# Patient Record
Sex: MALE | Race: BLACK OR AFRICAN AMERICAN | NOT HISPANIC OR LATINO | Employment: FULL TIME | URBAN - METROPOLITAN AREA
[De-identification: names, ages, dates, MRNs, and addresses within clinical notes are randomized per-mention and may not be internally consistent; named-entity substitution may affect disease eponyms.]

---

## 2023-11-06 PROBLEM — R07.89 ATYPICAL CHEST PAIN: Status: ACTIVE | Noted: 2023-11-06

## 2023-11-06 PROBLEM — M25.519 SHOULDER PAIN: Status: ACTIVE | Noted: 2023-11-06

## 2023-11-06 PROBLEM — B35.3 TINEA PEDIS OF BOTH FEET: Status: ACTIVE | Noted: 2023-11-06

## 2023-11-06 PROBLEM — N40.0 BENIGN PROSTATIC HYPERPLASIA: Status: ACTIVE | Noted: 2023-11-06

## 2023-11-06 PROBLEM — K75.9 HEPATITIS: Status: ACTIVE | Noted: 2023-11-06

## 2023-11-06 PROBLEM — R97.20 RISING PSA LEVEL: Status: ACTIVE | Noted: 2023-11-06

## 2023-11-06 PROBLEM — E78.5 DYSLIPIDEMIA: Status: ACTIVE | Noted: 2023-11-06

## 2023-11-06 PROBLEM — R07.9 CHEST PAIN, EXERTIONAL: Status: ACTIVE | Noted: 2023-11-06

## 2023-11-06 PROBLEM — E11.40 DIABETIC NEUROPATHY, TYPE II DIABETES MELLITUS (MULTI): Status: ACTIVE | Noted: 2023-11-06

## 2023-11-06 PROBLEM — C61 PROSTATE CANCER (MULTI): Status: ACTIVE | Noted: 2023-11-06

## 2023-11-06 PROBLEM — R79.89 DECREASED TESTOSTERONE LEVEL: Status: ACTIVE | Noted: 2023-11-06

## 2023-11-06 PROBLEM — R53.83 FATIGUE: Status: ACTIVE | Noted: 2023-11-06

## 2023-11-06 PROBLEM — N52.9 ED (ERECTILE DYSFUNCTION): Status: ACTIVE | Noted: 2023-11-06

## 2023-11-06 PROBLEM — E55.9 VITAMIN D DEFICIENCY: Status: ACTIVE | Noted: 2023-11-06

## 2023-11-06 PROBLEM — I10 HYPERTENSION: Status: ACTIVE | Noted: 2023-11-06

## 2023-11-06 RX ORDER — ATENOLOL 50 MG/1
50 TABLET ORAL DAILY
COMMUNITY

## 2023-11-06 RX ORDER — DULAGLUTIDE 1.5 MG/.5ML
1.5 INJECTION, SOLUTION SUBCUTANEOUS
COMMUNITY
Start: 2022-06-22 | End: 2024-01-03

## 2023-11-06 RX ORDER — LOSARTAN POTASSIUM AND HYDROCHLOROTHIAZIDE 25; 100 MG/1; MG/1
1 TABLET ORAL DAILY
COMMUNITY
Start: 2014-04-01

## 2023-11-06 RX ORDER — METFORMIN HYDROCHLORIDE 750 MG/1
TABLET, EXTENDED RELEASE ORAL
COMMUNITY
Start: 2015-10-30 | End: 2024-02-21 | Stop reason: SDUPTHER

## 2023-11-06 RX ORDER — GABAPENTIN 400 MG/1
1 CAPSULE ORAL 3 TIMES DAILY
COMMUNITY
Start: 2014-07-01

## 2023-11-06 RX ORDER — BLOOD SUGAR DIAGNOSTIC
STRIP MISCELLANEOUS
COMMUNITY
Start: 2018-03-15 | End: 2023-11-08 | Stop reason: ALTCHOICE

## 2023-11-06 RX ORDER — INSULIN DETEMIR 100 [IU]/ML
INJECTION, SOLUTION SUBCUTANEOUS
COMMUNITY
Start: 2019-05-24 | End: 2023-11-08 | Stop reason: ALTCHOICE

## 2023-11-06 RX ORDER — PIOGLITAZONEHYDROCHLORIDE 15 MG/1
1 TABLET ORAL DAILY
COMMUNITY
Start: 2020-09-30 | End: 2024-02-21 | Stop reason: SDUPTHER

## 2023-11-06 RX ORDER — GLIMEPIRIDE 4 MG/1
1 TABLET ORAL 2 TIMES DAILY
COMMUNITY
Start: 2017-07-19 | End: 2024-02-21 | Stop reason: WASHOUT

## 2023-11-06 RX ORDER — ERGOCALCIFEROL 1.25 MG/1
1 CAPSULE ORAL
COMMUNITY
Start: 2020-09-30 | End: 2023-11-20

## 2023-11-06 RX ORDER — ECONAZOLE NITRATE 10 MG/G
CREAM TOPICAL 2 TIMES DAILY
COMMUNITY
Start: 2020-09-30

## 2023-11-06 RX ORDER — TRAZODONE HYDROCHLORIDE 150 MG/1
1 TABLET ORAL NIGHTLY
COMMUNITY
Start: 2016-08-23

## 2023-11-06 RX ORDER — SILDENAFIL 100 MG/1
TABLET, FILM COATED ORAL
COMMUNITY

## 2023-11-08 ENCOUNTER — OFFICE VISIT (OUTPATIENT)
Dept: ENDOCRINOLOGY | Facility: CLINIC | Age: 73
End: 2023-11-08
Payer: MEDICARE

## 2023-11-08 VITALS
SYSTOLIC BLOOD PRESSURE: 144 MMHG | WEIGHT: 225 LBS | BODY MASS INDEX: 36.16 KG/M2 | HEART RATE: 82 BPM | HEIGHT: 66 IN | DIASTOLIC BLOOD PRESSURE: 84 MMHG

## 2023-11-08 DIAGNOSIS — E55.9 VITAMIN D DEFICIENCY: ICD-10-CM

## 2023-11-08 DIAGNOSIS — Z13.29 SCREENING FOR THYROID DISORDER: ICD-10-CM

## 2023-11-08 DIAGNOSIS — E11.9 TYPE 2 DIABETES MELLITUS WITHOUT COMPLICATION, WITH LONG-TERM CURRENT USE OF INSULIN (MULTI): Primary | ICD-10-CM

## 2023-11-08 DIAGNOSIS — Z79.4 TYPE 2 DIABETES MELLITUS WITHOUT COMPLICATION, WITH LONG-TERM CURRENT USE OF INSULIN (MULTI): Primary | ICD-10-CM

## 2023-11-08 LAB — POC HEMOGLOBIN A1C: 9.1 % (ref 4.2–6.5)

## 2023-11-08 PROCEDURE — 1036F TOBACCO NON-USER: CPT | Performed by: PHYSICIAN ASSISTANT

## 2023-11-08 PROCEDURE — 3077F SYST BP >= 140 MM HG: CPT | Performed by: PHYSICIAN ASSISTANT

## 2023-11-08 PROCEDURE — 99214 OFFICE O/P EST MOD 30 MIN: CPT | Performed by: PHYSICIAN ASSISTANT

## 2023-11-08 PROCEDURE — 1159F MED LIST DOCD IN RCRD: CPT | Performed by: PHYSICIAN ASSISTANT

## 2023-11-08 PROCEDURE — 83036 HEMOGLOBIN GLYCOSYLATED A1C: CPT | Performed by: PHYSICIAN ASSISTANT

## 2023-11-08 PROCEDURE — 1126F AMNT PAIN NOTED NONE PRSNT: CPT | Performed by: PHYSICIAN ASSISTANT

## 2023-11-08 PROCEDURE — 3079F DIAST BP 80-89 MM HG: CPT | Performed by: PHYSICIAN ASSISTANT

## 2023-11-08 RX ORDER — BLOOD SUGAR DIAGNOSTIC
1 STRIP MISCELLANEOUS 2 TIMES DAILY
COMMUNITY
End: 2024-01-09 | Stop reason: SDUPTHER

## 2023-11-08 RX ORDER — INSULIN GLARGINE 100 [IU]/ML
30 INJECTION, SOLUTION SUBCUTANEOUS NIGHTLY
COMMUNITY
End: 2024-01-03 | Stop reason: SDUPTHER

## 2023-11-08 NOTE — PROGRESS NOTES
"Subjective   Miguelito Edge is a 73 y.o. male who presents for follow-up of Type 2 diabetes mellitus. The initial diagnosis of diabetes was made  several years ago . The patient does have a known family history of diabetes.    Known complications due to diabetes included nephropathy and obesity    Cardiovascular risk factors include advanced age (older than 55 for men, 65 for women), diabetes mellitus, male gender, and obesity (BMI >= 30 kg/m2). The patient is not on an ACE inhibitor or angiotensin II receptor blocker.  The patient has not been previously hospitalized due to diabetic ketoacidosis.     Current symptoms/problems include hyperglycemia and paresthesia of the feet. His clinical course has been stable.     Current diabetes regimen is as follows:  pioglitazone, basaglar, glimepiride, jardiance, trulicity, and metformin      The patient is currently checking the blood glucose 2+ times per day.    Patient is using: glucometer    Hypoglycemia frequency: none at this time  Hypoglycemia awareness: Yes     Exercise: rarely   Meal panning: He is using avoidance of concentrated sweets.    Review of Systems   All other systems reviewed and are negative.      Objective   /84   Pulse 82   Ht 1.676 m (5' 6\")   Wt 102 kg (225 lb)   BMI 36.32 kg/m²   Physical Exam  Constitutional:       Appearance: Normal appearance. He is normal weight.   HENT:      Head: Normocephalic and atraumatic.      Mouth/Throat:      Mouth: Mucous membranes are dry.      Pharynx: Oropharynx is clear.   Eyes:      Extraocular Movements: Extraocular movements intact.      Conjunctiva/sclera: Conjunctivae normal.   Cardiovascular:      Rate and Rhythm: Normal rate and regular rhythm.      Pulses: Normal pulses.      Heart sounds: Normal heart sounds.   Pulmonary:      Effort: Pulmonary effort is normal.      Breath sounds: Normal breath sounds.   Abdominal:      General: Bowel sounds are normal.      Palpations: Abdomen is soft. "   Skin:     General: Skin is warm and dry.   Neurological:      General: No focal deficit present.      Mental Status: He is alert and oriented to person, place, and time. Mental status is at baseline.   Psychiatric:         Mood and Affect: Mood normal.         Behavior: Behavior normal.         Thought Content: Thought content normal.         Judgment: Judgment normal.         Lab Review  Glucose (mg/dL)   Date Value   08/31/2023 365 (H)   06/01/2023 390 (H)   03/02/2023 184 (H)     Hemoglobin A1C (%)   Date Value   09/30/2020 13.2   12/05/2019 9.2   05/23/2019 8.6     Bicarbonate (mmol/L)   Date Value   08/31/2023 29   06/01/2023 26   03/02/2023 29     Urea Nitrogen (mg/dL)   Date Value   08/31/2023 21   06/01/2023 14   03/02/2023 9     Creatinine (mg/dL)   Date Value   08/31/2023 1.14   06/01/2023 1.00   03/02/2023 0.78       Health Maintenance:   Foot Exam: referral placed to podiatry today  Eye Exam: up to date as of 7/2023  Lipid Panel: due for update, ordered today  Urine Albumin: due for update, ordered today    Assessment/Plan     Type 2 diabetes mellitus, is at goal. A1C above target of <7%    RX changes:  continue jardiance 25mg once daily, pioglitazone 15mg once daily, increase trulicity to 4.5mg once weekly, increase basaglar to 35 units and start taking in the morning, adjust glimepiride 4mg to lunch time, adjust metformin 750mg as 1 tablet twice a day      Education:  interpretation of lab results, blood sugar goals, and complications of diabetes mellitus  Follow up: I recommend diabetes care be 3 months with new endocrine provider.

## 2023-11-08 NOTE — PATIENT INSTRUCTIONS
Type 2 diabetes mellitus, is at goal. A1C above target of <7%    RX changes: continue jardiance 25mg once daily, pioglitazone 15mg once daily, increase trulicity to 4.5mg once weekly, increase basaglar to 35 units and start taking in the morning, adjust glimepiride 4mg to lunch time, adjust metformin 750mg as 1 tablet twice a day     Education:  interpretation of lab results, blood sugar goals, and complications of diabetes mellitus  Follow up: I recommend diabetes care be 3 months as scheduled then with new endocrine provider.  Try to work on eating your meals in order of 1- green vegetables, 2- protein, 3- starch/carb

## 2023-11-18 DIAGNOSIS — E55.9 VITAMIN D DEFICIENCY: Primary | ICD-10-CM

## 2023-11-20 RX ORDER — ERGOCALCIFEROL 1.25 MG/1
1 CAPSULE ORAL
Qty: 13 CAPSULE | Refills: 0 | Status: SHIPPED | OUTPATIENT
Start: 2023-11-20 | End: 2024-02-14 | Stop reason: SDUPTHER

## 2023-11-24 RX ORDER — EPINEPHRINE 0.3 MG/.3ML
0.3 INJECTION SUBCUTANEOUS EVERY 5 MIN PRN
Status: CANCELLED | OUTPATIENT
Start: 2023-11-30

## 2023-11-24 RX ORDER — DIPHENHYDRAMINE HYDROCHLORIDE 50 MG/ML
50 INJECTION INTRAMUSCULAR; INTRAVENOUS AS NEEDED
Status: CANCELLED | OUTPATIENT
Start: 2023-11-30

## 2023-11-24 RX ORDER — ALBUTEROL SULFATE 0.83 MG/ML
3 SOLUTION RESPIRATORY (INHALATION) AS NEEDED
Status: CANCELLED | OUTPATIENT
Start: 2023-11-30

## 2023-11-24 RX ORDER — FAMOTIDINE 10 MG/ML
20 INJECTION INTRAVENOUS ONCE AS NEEDED
Status: CANCELLED | OUTPATIENT
Start: 2023-11-30

## 2023-11-24 NOTE — PROGRESS NOTES
Patient ID: Miguelito Edge is a 73 y.o. male.  Cancer History:          Genitourinary-Prostate         AJCC Edition: 7th (AJCC), Diagnosis Date: 15-Duane-2014, IV (Gl 4+3, iPSA 636), T4 N1  M1 [place] = 7 PSA >= 20     Treatment Synopsis:    PCa history dates back to 2014 when he was diagnosed with high-risk PCa (gCFU041/cT4/N+/GS7- high volume disease) All baseline scans showed no evidence of distant  disease though he did have LN pelvic disease. At that time the decision was for him to initiate systemic therapy with ADT and received definitive therapy with 81Gy that was completed in the summer of 2014. ADT was completed in 2017.  Restarted ADT in 9/2020 for rising PSA       Subjective    HPI  Seen in follow up for his prostate cancer. Here with his wife.   Feeling well.   Energy is good.   Appetite is good.   Denies cough/sob.   Denies n/v.   Denies pain.   Denies dysuria.   Patient said sometimes when he falls asleep with his tshirt on, he get pink irritated skin in his axilla.     Objective    BSA: 2.19 meters squared  /71 (BP Location: Left arm, Patient Position: Sitting)   Pulse 82   Temp 35.9 °C (96.6 °F)   Resp 18   Wt 103 kg (226 lb 13.7 oz)   SpO2 96%   BMI 36.62 kg/m²      Physical Exam  Vitals reviewed.   Constitutional:       Appearance: Normal appearance.   Eyes:      General: No scleral icterus.  Cardiovascular:      Rate and Rhythm: Normal rate and regular rhythm.   Musculoskeletal:      Right lower leg: No edema.      Left lower leg: No edema.   Lymphadenopathy:      Cervical: No cervical adenopathy.   Skin:     General: Skin is warm and dry.      Comments: Skin reddened under L axilla.    Neurological:      Mental Status: He is alert and oriented to person, place, and time.   Psychiatric:         Mood and Affect: Mood normal.         Behavior: Behavior normal.         Performance Status:  Asymptomatic    Lab Results   Component Value Date    PSA 0.41 08/31/2023    PSA 0.30 06/01/2023     PSA 0.43 03/02/2023     Lab Results   Component Value Date    WBC 5.4 11/30/2023    HGB 13.5 11/30/2023    HCT 41.1 11/30/2023    MCV 88 11/30/2023     11/30/2023     Lab Results   Component Value Date    GLUCOSE 365 (H) 08/31/2023    CALCIUM 9.8 08/31/2023     08/31/2023    K 4.5 08/31/2023    CO2 29 08/31/2023    CL 99 08/31/2023    BUN 21 08/31/2023    CREATININE 1.14 08/31/2023     Lab Results   Component Value Date    TESTOSTERONE <60 (L) 08/31/2023     Lab Results   Component Value Date    ALT 11 08/31/2023    AST 12 08/31/2023    ALKPHOS 72 08/31/2023    BILITOT 0.5 08/31/2023      Assessment/Plan   Labs-pending. Patient requested I call him only if PSA is concerning.     3 mos eligard injection today.     Per Dr. Paagn's note from August: Discussed CAB and perhaps the possibility of cycling off. Will remain on ADT until Spring 2024 for now. Today patient tells me he is leaning towards staying on therapy, but he will discuss with Dr. Pagan on return.     RTC in 3 months with labs and potentially for next injection.     Discussed need while on ADT to maintain adequate cardiovascular health, exercise, dietary modifications,  bone health with calcium 1000 mg with vitamin D 400-800 international units. Patient does take calcium and Vit D. He said he walks/is always moving. He eats lean meats instead of red meats and limit sugar intake (has diabetes). Ordered DEXA for return.     Continues to see his PCP and endocrine.     Nystatin cream prescribed for skin L axilla.          Diagnoses and all orders for this visit:  Prostate cancer (CMS/HCC)  -     XR DEXA bone density; Future  Malignant neoplasm of prostate (CMS/HCC)  -     Comprehensive Metabolic Panel; Future  -     CBC and Auto Differential; Future  -     Prostate Specific Antigen; Future  -     Testosterone; Future  -     Clinic Appointment Request VIJAY PAGAN; Future  -     CBC and Auto Differential; Future  -     Comprehensive Metabolic  Panel; Future  -     Prostate Specific Antigen; Future  -     Testosterone; Future  Encounter for monitoring androgen deprivation therapy  -     XR DEXA bone density; Future  Rash  -     nystatin (Mycostatin) cream; Apply topically 2 times a day for 10 days.  Other orders  -     leuprolide (3-month) (Eligard) injection 22.5 mg  -     sodium chloride 0.9 % bolus 500 mL  -     dextrose 5 % in water (D5W) bolus  -     diphenhydrAMINE (BENADryl) injection 50 mg  -     methylPREDNISolone sod succinate (PF) (SOLU-Medrol) 40 mg/mL injection 40 mg  -     famotidine PF (Pepcid) injection 20 mg  -     EPINEPHrine (Epipen) injection syringe 0.3 mg  -     albuterol 2.5 mg /3 mL (0.083 %) nebulizer solution 3 mL           DAISHA Sanchez-CNP

## 2023-11-30 ENCOUNTER — INFUSION (OUTPATIENT)
Dept: HEMATOLOGY/ONCOLOGY | Facility: HOSPITAL | Age: 73
End: 2023-11-30
Payer: MEDICARE

## 2023-11-30 ENCOUNTER — LAB (OUTPATIENT)
Dept: LAB | Facility: HOSPITAL | Age: 73
End: 2023-11-30
Payer: MEDICARE

## 2023-11-30 ENCOUNTER — OFFICE VISIT (OUTPATIENT)
Dept: HEMATOLOGY/ONCOLOGY | Facility: HOSPITAL | Age: 73
End: 2023-11-30
Payer: MEDICARE

## 2023-11-30 VITALS
BODY MASS INDEX: 36.62 KG/M2 | OXYGEN SATURATION: 96 % | WEIGHT: 226.85 LBS | RESPIRATION RATE: 18 BRPM | DIASTOLIC BLOOD PRESSURE: 71 MMHG | HEART RATE: 82 BPM | TEMPERATURE: 96.6 F | SYSTOLIC BLOOD PRESSURE: 134 MMHG

## 2023-11-30 DIAGNOSIS — R21 RASH: ICD-10-CM

## 2023-11-30 DIAGNOSIS — C61 MALIGNANT NEOPLASM OF PROSTATE (MULTI): ICD-10-CM

## 2023-11-30 DIAGNOSIS — E55.9 VITAMIN D DEFICIENCY: ICD-10-CM

## 2023-11-30 DIAGNOSIS — C61 PROSTATE CANCER (MULTI): Primary | ICD-10-CM

## 2023-11-30 DIAGNOSIS — C61 PROSTATE CANCER (MULTI): ICD-10-CM

## 2023-11-30 DIAGNOSIS — Z79.818 ENCOUNTER FOR MONITORING ANDROGEN DEPRIVATION THERAPY: ICD-10-CM

## 2023-11-30 DIAGNOSIS — Z13.29 SCREENING FOR THYROID DISORDER: ICD-10-CM

## 2023-11-30 LAB
ALBUMIN SERPL BCP-MCNC: 4.4 G/DL (ref 3.4–5)
ALP SERPL-CCNC: 69 U/L (ref 33–136)
ALT SERPL W P-5'-P-CCNC: 10 U/L (ref 10–52)
ANION GAP SERPL CALC-SCNC: 12 MMOL/L (ref 10–20)
AST SERPL W P-5'-P-CCNC: 12 U/L (ref 9–39)
BASOPHILS # BLD AUTO: 0.02 X10*3/UL (ref 0–0.1)
BASOPHILS NFR BLD AUTO: 0.4 %
BILIRUB SERPL-MCNC: 0.4 MG/DL (ref 0–1.2)
BUN SERPL-MCNC: 14 MG/DL (ref 6–23)
CALCIUM SERPL-MCNC: 10.1 MG/DL (ref 8.6–10.3)
CHLORIDE SERPL-SCNC: 102 MMOL/L (ref 98–107)
CO2 SERPL-SCNC: 30 MMOL/L (ref 21–32)
CREAT SERPL-MCNC: 0.99 MG/DL (ref 0.5–1.3)
EOSINOPHIL # BLD AUTO: 0.19 X10*3/UL (ref 0–0.4)
EOSINOPHIL NFR BLD AUTO: 3.5 %
ERYTHROCYTE [DISTWIDTH] IN BLOOD BY AUTOMATED COUNT: 13.2 % (ref 11.5–14.5)
GFR SERPL CREATININE-BSD FRML MDRD: 81 ML/MIN/1.73M*2
GLUCOSE SERPL-MCNC: 180 MG/DL (ref 74–99)
HCT VFR BLD AUTO: 41.1 % (ref 41–52)
HGB BLD-MCNC: 13.5 G/DL (ref 13.5–17.5)
IMM GRANULOCYTES # BLD AUTO: 0.01 X10*3/UL (ref 0–0.5)
IMM GRANULOCYTES NFR BLD AUTO: 0.2 % (ref 0–0.9)
LYMPHOCYTES # BLD AUTO: 2.27 X10*3/UL (ref 0.8–3)
LYMPHOCYTES NFR BLD AUTO: 41.8 %
MCH RBC QN AUTO: 29 PG (ref 26–34)
MCHC RBC AUTO-ENTMCNC: 32.8 G/DL (ref 32–36)
MCV RBC AUTO: 88 FL (ref 80–100)
MONOCYTES # BLD AUTO: 0.44 X10*3/UL (ref 0.05–0.8)
MONOCYTES NFR BLD AUTO: 8.1 %
NEUTROPHILS # BLD AUTO: 2.5 X10*3/UL (ref 1.6–5.5)
NEUTROPHILS NFR BLD AUTO: 46 %
NRBC BLD-RTO: 0 /100 WBCS (ref 0–0)
PLATELET # BLD AUTO: 213 X10*3/UL (ref 150–450)
POTASSIUM SERPL-SCNC: 4.3 MMOL/L (ref 3.5–5.3)
PROT SERPL-MCNC: 8 G/DL (ref 6.4–8.2)
PSA SERPL-MCNC: 0.43 NG/ML
RBC # BLD AUTO: 4.65 X10*6/UL (ref 4.5–5.9)
SODIUM SERPL-SCNC: 140 MMOL/L (ref 136–145)
TESTOST SERPL-MCNC: <30 NG/DL (ref 240–1000)
WBC # BLD AUTO: 5.4 X10*3/UL (ref 4.4–11.3)

## 2023-11-30 PROCEDURE — 1126F AMNT PAIN NOTED NONE PRSNT: CPT | Performed by: NURSE PRACTITIONER

## 2023-11-30 PROCEDURE — 84403 ASSAY OF TOTAL TESTOSTERONE: CPT

## 2023-11-30 PROCEDURE — 2500000004 HC RX 250 GENERAL PHARMACY W/ HCPCS (ALT 636 FOR OP/ED): Mod: JZ | Performed by: NURSE PRACTITIONER

## 2023-11-30 PROCEDURE — 99214 OFFICE O/P EST MOD 30 MIN: CPT | Mod: 25 | Performed by: NURSE PRACTITIONER

## 2023-11-30 PROCEDURE — 99214 OFFICE O/P EST MOD 30 MIN: CPT | Performed by: NURSE PRACTITIONER

## 2023-11-30 PROCEDURE — 96402 CHEMO HORMON ANTINEOPL SQ/IM: CPT

## 2023-11-30 PROCEDURE — 80053 COMPREHEN METABOLIC PANEL: CPT

## 2023-11-30 PROCEDURE — 85025 COMPLETE CBC W/AUTO DIFF WBC: CPT

## 2023-11-30 PROCEDURE — 3075F SYST BP GE 130 - 139MM HG: CPT | Performed by: NURSE PRACTITIONER

## 2023-11-30 PROCEDURE — 84153 ASSAY OF PSA TOTAL: CPT

## 2023-11-30 PROCEDURE — 1159F MED LIST DOCD IN RCRD: CPT | Performed by: NURSE PRACTITIONER

## 2023-11-30 PROCEDURE — 36415 COLL VENOUS BLD VENIPUNCTURE: CPT

## 2023-11-30 PROCEDURE — 1036F TOBACCO NON-USER: CPT | Performed by: NURSE PRACTITIONER

## 2023-11-30 PROCEDURE — 3078F DIAST BP <80 MM HG: CPT | Performed by: NURSE PRACTITIONER

## 2023-11-30 RX ORDER — FLASH GLUCOSE SENSOR
KIT MISCELLANEOUS
COMMUNITY
Start: 2023-11-27

## 2023-11-30 RX ORDER — FAMOTIDINE 10 MG/ML
20 INJECTION INTRAVENOUS ONCE AS NEEDED
Status: CANCELLED | OUTPATIENT
Start: 2024-02-22

## 2023-11-30 RX ORDER — FAMOTIDINE 10 MG/ML
20 INJECTION INTRAVENOUS ONCE AS NEEDED
Status: DISCONTINUED | OUTPATIENT
Start: 2023-11-30 | End: 2023-11-30 | Stop reason: HOSPADM

## 2023-11-30 RX ORDER — EPINEPHRINE 0.3 MG/.3ML
0.3 INJECTION SUBCUTANEOUS EVERY 5 MIN PRN
Status: CANCELLED | OUTPATIENT
Start: 2024-02-22

## 2023-11-30 RX ORDER — FLASH GLUCOSE SCANNING READER
EACH MISCELLANEOUS
COMMUNITY
Start: 2023-11-27

## 2023-11-30 RX ORDER — DOCUSATE SODIUM 100 MG/1
100 CAPSULE, LIQUID FILLED ORAL DAILY PRN
COMMUNITY
Start: 2023-11-20

## 2023-11-30 RX ORDER — DIPHENHYDRAMINE HYDROCHLORIDE 50 MG/ML
50 INJECTION INTRAMUSCULAR; INTRAVENOUS AS NEEDED
Status: DISCONTINUED | OUTPATIENT
Start: 2023-11-30 | End: 2023-11-30 | Stop reason: HOSPADM

## 2023-11-30 RX ORDER — ROSUVASTATIN CALCIUM 5 MG/1
5 TABLET, COATED ORAL NIGHTLY
COMMUNITY
Start: 2023-11-03 | End: 2024-03-18 | Stop reason: SDUPTHER

## 2023-11-30 RX ORDER — DIPHENHYDRAMINE HYDROCHLORIDE 50 MG/ML
50 INJECTION INTRAMUSCULAR; INTRAVENOUS AS NEEDED
Status: CANCELLED | OUTPATIENT
Start: 2024-02-22

## 2023-11-30 RX ORDER — EPINEPHRINE 0.3 MG/.3ML
0.3 INJECTION SUBCUTANEOUS EVERY 5 MIN PRN
Status: DISCONTINUED | OUTPATIENT
Start: 2023-11-30 | End: 2023-11-30 | Stop reason: HOSPADM

## 2023-11-30 RX ORDER — OMEPRAZOLE 40 MG/1
1 CAPSULE, DELAYED RELEASE ORAL DAILY
COMMUNITY
Start: 2023-11-09 | End: 2024-11-09

## 2023-11-30 RX ORDER — ALBUTEROL SULFATE 0.83 MG/ML
3 SOLUTION RESPIRATORY (INHALATION) AS NEEDED
Status: CANCELLED | OUTPATIENT
Start: 2024-02-22

## 2023-11-30 RX ORDER — LEVOTHYROXINE SODIUM 25 UG/1
TABLET ORAL
COMMUNITY
Start: 2023-11-29 | End: 2024-03-18 | Stop reason: SDUPTHER

## 2023-11-30 RX ORDER — ALBUTEROL SULFATE 0.83 MG/ML
3 SOLUTION RESPIRATORY (INHALATION) AS NEEDED
Status: DISCONTINUED | OUTPATIENT
Start: 2023-11-30 | End: 2023-11-30 | Stop reason: HOSPADM

## 2023-11-30 RX ORDER — NYSTATIN 100000 U/G
CREAM TOPICAL 2 TIMES DAILY
Qty: 15 G | Refills: 1 | Status: SHIPPED | OUTPATIENT
Start: 2023-11-30 | End: 2023-12-10

## 2023-11-30 RX ORDER — HYDROCHLOROTHIAZIDE 12.5 MG/1
12.5 TABLET ORAL
COMMUNITY
Start: 2023-09-14

## 2023-11-30 RX ADMIN — LEUPROLIDE ACETATE 22.5 MG: KIT SUBCUTANEOUS at 16:33

## 2023-11-30 ASSESSMENT — PAIN SCALES - GENERAL: PAINLEVEL: 0-NO PAIN

## 2023-11-30 ASSESSMENT — PATIENT HEALTH QUESTIONNAIRE - PHQ9
1. LITTLE INTEREST OR PLEASURE IN DOING THINGS: NOT AT ALL
SUM OF ALL RESPONSES TO PHQ9 QUESTIONS 1 AND 2: 0
2. FEELING DOWN, DEPRESSED OR HOPELESS: NOT AT ALL

## 2023-11-30 ASSESSMENT — COLUMBIA-SUICIDE SEVERITY RATING SCALE - C-SSRS
6. HAVE YOU EVER DONE ANYTHING, STARTED TO DO ANYTHING, OR PREPARED TO DO ANYTHING TO END YOUR LIFE?: NO
1. IN THE PAST MONTH, HAVE YOU WISHED YOU WERE DEAD OR WISHED YOU COULD GO TO SLEEP AND NOT WAKE UP?: NO
2. HAVE YOU ACTUALLY HAD ANY THOUGHTS OF KILLING YOURSELF?: NO

## 2023-12-26 DIAGNOSIS — E11.40 TYPE 2 DIABETES MELLITUS WITH DIABETIC NEUROPATHY, WITH LONG-TERM CURRENT USE OF INSULIN (MULTI): Primary | ICD-10-CM

## 2023-12-26 DIAGNOSIS — Z79.4 TYPE 2 DIABETES MELLITUS WITH DIABETIC NEUROPATHY, WITH LONG-TERM CURRENT USE OF INSULIN (MULTI): Primary | ICD-10-CM

## 2023-12-27 RX ORDER — EMPAGLIFLOZIN 25 MG/1
25 TABLET, FILM COATED ORAL
Qty: 90 TABLET | Refills: 0 | Status: SHIPPED | OUTPATIENT
Start: 2023-12-27 | End: 2024-01-09 | Stop reason: SDUPTHER

## 2024-01-03 DIAGNOSIS — Z79.4 TYPE 2 DIABETES MELLITUS WITH HYPERGLYCEMIA, WITH LONG-TERM CURRENT USE OF INSULIN (MULTI): Primary | ICD-10-CM

## 2024-01-03 DIAGNOSIS — E11.65 TYPE 2 DIABETES MELLITUS WITH HYPERGLYCEMIA, WITH LONG-TERM CURRENT USE OF INSULIN (MULTI): Primary | ICD-10-CM

## 2024-01-03 RX ORDER — INSULIN GLARGINE 100 [IU]/ML
30 INJECTION, SOLUTION SUBCUTANEOUS NIGHTLY
Qty: 27 ML | Refills: 3 | Status: SHIPPED
Start: 2024-01-03 | End: 2024-02-21 | Stop reason: WASHOUT

## 2024-01-03 RX ORDER — DULAGLUTIDE 4.5 MG/.5ML
4.5 INJECTION, SOLUTION SUBCUTANEOUS
Qty: 8 ML | Refills: 2 | Status: SHIPPED | OUTPATIENT
Start: 2024-01-03 | End: 2025-01-02

## 2024-01-09 DIAGNOSIS — E11.40 TYPE 2 DIABETES MELLITUS WITH DIABETIC NEUROPATHY, WITH LONG-TERM CURRENT USE OF INSULIN (MULTI): ICD-10-CM

## 2024-01-09 DIAGNOSIS — Z79.4 TYPE 2 DIABETES MELLITUS WITH DIABETIC NEUROPATHY, WITH LONG-TERM CURRENT USE OF INSULIN (MULTI): ICD-10-CM

## 2024-01-09 RX ORDER — BLOOD SUGAR DIAGNOSTIC
1 STRIP MISCELLANEOUS 2 TIMES DAILY
Qty: 200 STRIP | Refills: 2 | Status: SHIPPED | OUTPATIENT
Start: 2024-01-09

## 2024-02-14 DIAGNOSIS — E55.9 VITAMIN D DEFICIENCY: ICD-10-CM

## 2024-02-14 RX ORDER — ERGOCALCIFEROL 1.25 MG/1
1 CAPSULE ORAL
Qty: 13 CAPSULE | Refills: 0 | Status: SHIPPED | OUTPATIENT
Start: 2024-02-14 | End: 2024-05-13 | Stop reason: SDUPTHER

## 2024-02-18 ENCOUNTER — INFUSION (OUTPATIENT)
Dept: HEMATOLOGY/ONCOLOGY | Facility: HOSPITAL | Age: 74
End: 2024-02-18
Payer: MEDICARE

## 2024-02-18 VITALS
SYSTOLIC BLOOD PRESSURE: 140 MMHG | BODY MASS INDEX: 37.43 KG/M2 | OXYGEN SATURATION: 98 % | RESPIRATION RATE: 18 BRPM | DIASTOLIC BLOOD PRESSURE: 69 MMHG | WEIGHT: 231.92 LBS | HEART RATE: 85 BPM | TEMPERATURE: 97.9 F

## 2024-02-18 DIAGNOSIS — C61 PROSTATE CANCER (MULTI): ICD-10-CM

## 2024-02-18 LAB
ALBUMIN SERPL BCP-MCNC: 4.1 G/DL (ref 3.4–5)
ALP SERPL-CCNC: 69 U/L (ref 33–136)
ALT SERPL W P-5'-P-CCNC: 12 U/L (ref 10–52)
ANION GAP SERPL CALC-SCNC: 13 MMOL/L (ref 10–20)
AST SERPL W P-5'-P-CCNC: 15 U/L (ref 9–39)
BASOPHILS # BLD AUTO: 0.02 X10*3/UL (ref 0–0.1)
BASOPHILS NFR BLD AUTO: 0.3 %
BILIRUB SERPL-MCNC: 0.5 MG/DL (ref 0–1.2)
BUN SERPL-MCNC: 14 MG/DL (ref 6–23)
CALCIUM SERPL-MCNC: 9.8 MG/DL (ref 8.6–10.6)
CHLORIDE SERPL-SCNC: 102 MMOL/L (ref 98–107)
CO2 SERPL-SCNC: 26 MMOL/L (ref 21–32)
CREAT SERPL-MCNC: 1.04 MG/DL (ref 0.5–1.3)
EGFRCR SERPLBLD CKD-EPI 2021: 76 ML/MIN/1.73M*2
EOSINOPHIL # BLD AUTO: 0.18 X10*3/UL (ref 0–0.4)
EOSINOPHIL NFR BLD AUTO: 2.7 %
ERYTHROCYTE [DISTWIDTH] IN BLOOD BY AUTOMATED COUNT: 12.7 % (ref 11.5–14.5)
GLUCOSE SERPL-MCNC: 203 MG/DL (ref 74–99)
HCT VFR BLD AUTO: 41.3 % (ref 41–52)
HGB BLD-MCNC: 13 G/DL (ref 13.5–17.5)
IMM GRANULOCYTES # BLD AUTO: 0.03 X10*3/UL (ref 0–0.5)
IMM GRANULOCYTES NFR BLD AUTO: 0.4 % (ref 0–0.9)
LYMPHOCYTES # BLD AUTO: 2.72 X10*3/UL (ref 0.8–3)
LYMPHOCYTES NFR BLD AUTO: 40.5 %
MCH RBC QN AUTO: 28.6 PG (ref 26–34)
MCHC RBC AUTO-ENTMCNC: 31.5 G/DL (ref 32–36)
MCV RBC AUTO: 91 FL (ref 80–100)
MONOCYTES # BLD AUTO: 0.54 X10*3/UL (ref 0.05–0.8)
MONOCYTES NFR BLD AUTO: 8 %
NEUTROPHILS # BLD AUTO: 3.22 X10*3/UL (ref 1.6–5.5)
NEUTROPHILS NFR BLD AUTO: 48.1 %
NRBC BLD-RTO: 0 /100 WBCS (ref 0–0)
PLATELET # BLD AUTO: 217 X10*3/UL (ref 150–450)
POTASSIUM SERPL-SCNC: 3.9 MMOL/L (ref 3.5–5.3)
PROT SERPL-MCNC: 7.4 G/DL (ref 6.4–8.2)
PSA SERPL-MCNC: 0.39 NG/ML
RBC # BLD AUTO: 4.55 X10*6/UL (ref 4.5–5.9)
SODIUM SERPL-SCNC: 137 MMOL/L (ref 136–145)
TESTOST SERPL-MCNC: <30 NG/DL (ref 240–1000)
WBC # BLD AUTO: 6.7 X10*3/UL (ref 4.4–11.3)

## 2024-02-18 PROCEDURE — 85025 COMPLETE CBC W/AUTO DIFF WBC: CPT

## 2024-02-18 PROCEDURE — 80053 COMPREHEN METABOLIC PANEL: CPT

## 2024-02-18 PROCEDURE — 36415 COLL VENOUS BLD VENIPUNCTURE: CPT

## 2024-02-18 PROCEDURE — 84153 ASSAY OF PSA TOTAL: CPT

## 2024-02-18 PROCEDURE — 84403 ASSAY OF TOTAL TESTOSTERONE: CPT

## 2024-02-18 PROCEDURE — 96372 THER/PROPH/DIAG INJ SC/IM: CPT | Performed by: NURSE PRACTITIONER

## 2024-02-18 PROCEDURE — 96402 CHEMO HORMON ANTINEOPL SQ/IM: CPT

## 2024-02-18 PROCEDURE — 2500000004 HC RX 250 GENERAL PHARMACY W/ HCPCS (ALT 636 FOR OP/ED): Mod: JZ | Performed by: NURSE PRACTITIONER

## 2024-02-18 RX ORDER — ALBUTEROL SULFATE 0.83 MG/ML
3 SOLUTION RESPIRATORY (INHALATION) AS NEEDED
Status: CANCELLED | OUTPATIENT
Start: 2024-05-12

## 2024-02-18 RX ORDER — FAMOTIDINE 10 MG/ML
20 INJECTION INTRAVENOUS ONCE AS NEEDED
Status: CANCELLED | OUTPATIENT
Start: 2024-05-12

## 2024-02-18 RX ORDER — DIPHENHYDRAMINE HYDROCHLORIDE 50 MG/ML
50 INJECTION INTRAMUSCULAR; INTRAVENOUS AS NEEDED
Status: CANCELLED | OUTPATIENT
Start: 2024-05-12

## 2024-02-18 RX ORDER — EPINEPHRINE 0.3 MG/.3ML
0.3 INJECTION SUBCUTANEOUS EVERY 5 MIN PRN
Status: CANCELLED | OUTPATIENT
Start: 2024-05-12

## 2024-02-18 RX ADMIN — LEUPROLIDE ACETATE 22.5 MG: 22.5 INJECTION, SUSPENSION, EXTENDED RELEASE SUBCUTANEOUS at 13:34

## 2024-02-21 ENCOUNTER — OFFICE VISIT (OUTPATIENT)
Dept: ENDOCRINOLOGY | Facility: CLINIC | Age: 74
End: 2024-02-21
Payer: MEDICARE

## 2024-02-21 VITALS
WEIGHT: 231 LBS | DIASTOLIC BLOOD PRESSURE: 84 MMHG | BODY MASS INDEX: 37.12 KG/M2 | SYSTOLIC BLOOD PRESSURE: 140 MMHG | HEIGHT: 66 IN | HEART RATE: 74 BPM

## 2024-02-21 DIAGNOSIS — Z79.4 TYPE 2 DIABETES MELLITUS WITH HYPERGLYCEMIA, WITH LONG-TERM CURRENT USE OF INSULIN (MULTI): Primary | ICD-10-CM

## 2024-02-21 DIAGNOSIS — Z79.4 TYPE 2 DIABETES MELLITUS WITH DIABETIC NEUROPATHY, WITH LONG-TERM CURRENT USE OF INSULIN (MULTI): ICD-10-CM

## 2024-02-21 DIAGNOSIS — E11.65 TYPE 2 DIABETES MELLITUS WITH HYPERGLYCEMIA, WITH LONG-TERM CURRENT USE OF INSULIN (MULTI): Primary | ICD-10-CM

## 2024-02-21 DIAGNOSIS — E11.40 TYPE 2 DIABETES MELLITUS WITH DIABETIC NEUROPATHY, WITH LONG-TERM CURRENT USE OF INSULIN (MULTI): ICD-10-CM

## 2024-02-21 LAB — POC HEMOGLOBIN A1C: 9.4 % (ref 4.2–6.5)

## 2024-02-21 PROCEDURE — 99214 OFFICE O/P EST MOD 30 MIN: CPT | Performed by: PHYSICIAN ASSISTANT

## 2024-02-21 PROCEDURE — 83036 HEMOGLOBIN GLYCOSYLATED A1C: CPT | Performed by: PHYSICIAN ASSISTANT

## 2024-02-21 PROCEDURE — 3079F DIAST BP 80-89 MM HG: CPT | Performed by: PHYSICIAN ASSISTANT

## 2024-02-21 PROCEDURE — 1036F TOBACCO NON-USER: CPT | Performed by: PHYSICIAN ASSISTANT

## 2024-02-21 PROCEDURE — 1126F AMNT PAIN NOTED NONE PRSNT: CPT | Performed by: PHYSICIAN ASSISTANT

## 2024-02-21 PROCEDURE — 1159F MED LIST DOCD IN RCRD: CPT | Performed by: PHYSICIAN ASSISTANT

## 2024-02-21 PROCEDURE — 3077F SYST BP >= 140 MM HG: CPT | Performed by: PHYSICIAN ASSISTANT

## 2024-02-21 RX ORDER — INSULIN GLARGINE 100 [IU]/ML
30 INJECTION, SOLUTION SUBCUTANEOUS NIGHTLY
Qty: 39 ML | Refills: 2
Start: 2024-02-21 | End: 2025-03-17

## 2024-02-21 RX ORDER — PIOGLITAZONEHYDROCHLORIDE 15 MG/1
15 TABLET ORAL DAILY
Qty: 90 TABLET | Refills: 3 | Status: SHIPPED | OUTPATIENT
Start: 2024-02-21 | End: 2025-02-20

## 2024-02-21 RX ORDER — METFORMIN HYDROCHLORIDE 750 MG/1
750 TABLET, EXTENDED RELEASE ORAL
Qty: 180 TABLET | Refills: 3 | Status: SHIPPED | OUTPATIENT
Start: 2024-02-21

## 2024-02-21 ASSESSMENT — PAIN SCALES - GENERAL: PAINLEVEL: 0-NO PAIN

## 2024-02-21 NOTE — PROGRESS NOTES
"Subjective   Miguelito Edge is a 74 y.o. male who presents for follow-up of Type 2 diabetes mellitus. The initial diagnosis of diabetes was made  several years ago . The patient does have a known family history of diabetes.    Known complications due to diabetes included nephropathy and obesity    Cardiovascular risk factors include advanced age (older than 55 for men, 65 for women), diabetes mellitus, male gender, and obesity (BMI >= 30 kg/m2). The patient is not on an ACE inhibitor or angiotensin II receptor blocker.  The patient has not been previously hospitalized due to diabetic ketoacidosis.     Current symptoms/problems include hyperglycemia and paresthesia of the feet. His clinical course has been stable. No improvement in labs since last appointment. Rather than add meal time insulin, we discussed plan to focus on lifestyle modification until he sees new endocrine provider.     Current diabetes regimen is as follows:  pioglitazone, basaglar, glimepiride, jardiance, trulicity, and metformin      The patient is currently checking the blood glucose 2+ times per day.    Patient is using: glucometer    Hypoglycemia frequency: none at this time  Hypoglycemia awareness: Yes     Exercise: rarely   Meal panning: He is using avoidance of concentrated sweets.    Review of Systems   All other systems reviewed and are negative.      Objective   /84   Pulse 74   Ht 1.676 m (5' 6\")   Wt 105 kg (231 lb)   BMI 37.28 kg/m²   Physical Exam  Constitutional:       Appearance: Normal appearance. He is normal weight.   HENT:      Head: Normocephalic and atraumatic.      Mouth/Throat:      Mouth: Mucous membranes are dry.      Pharynx: Oropharynx is clear.   Eyes:      Extraocular Movements: Extraocular movements intact.      Conjunctiva/sclera: Conjunctivae normal.   Cardiovascular:      Rate and Rhythm: Normal rate and regular rhythm.      Pulses: Normal pulses.      Heart sounds: Normal heart sounds.   Pulmonary:     "  Effort: Pulmonary effort is normal.      Breath sounds: Normal breath sounds.   Abdominal:      General: Bowel sounds are normal.      Palpations: Abdomen is soft.   Skin:     General: Skin is warm and dry.   Neurological:      General: No focal deficit present.      Mental Status: He is alert and oriented to person, place, and time. Mental status is at baseline.   Psychiatric:         Mood and Affect: Mood normal.         Behavior: Behavior normal.         Thought Content: Thought content normal.         Judgment: Judgment normal.         Lab Review  Glucose (mg/dL)   Date Value   02/18/2024 203 (H)   11/30/2023 180 (H)   08/31/2023 365 (H)   06/01/2023 390 (H)   03/02/2023 184 (H)     POC HEMOGLOBIN A1c (%)   Date Value   02/21/2024 9.4 (A)   11/08/2023 9.1 (A)     Hemoglobin A1C (%)   Date Value   09/30/2020 13.2   12/05/2019 9.2   05/23/2019 8.6     Bicarbonate (mmol/L)   Date Value   02/18/2024 26   11/30/2023 30   08/31/2023 29   06/01/2023 26   03/02/2023 29     Urea Nitrogen (mg/dL)   Date Value   02/18/2024 14   11/30/2023 14   08/31/2023 21   06/01/2023 14   03/02/2023 9     Creatinine (mg/dL)   Date Value   02/18/2024 1.04   11/30/2023 0.99   08/31/2023 1.14   06/01/2023 1.00   03/02/2023 0.78       Health Maintenance:   Foot Exam: referral placed to podiatry today  Eye Exam: up to date as of 7/2023  Lipid Panel: due for update, ordered today  Urine Albumin: due for update, ordered today    Assessment/Plan     Type 2 diabetes mellitus with hyperglycemia, with long-term current use of insulin (CMS/[facility])  -     metFORMIN XR (Glucophage-XR) 750 mg 24 hr tablet; Take 1 tablet (750 mg) by mouth 2 times a day before meals.  -     pioglitazone (Actos) 15 mg tablet; Take 1 tablet (15 mg) by mouth once daily.  -     Basaglar KwikPen U-100 Insulin 100 unit/mL (3 mL) pen; Inject 30 Units under the skin once daily at bedtime. Take as directed per insulin instructions.  -     POCT glycosylated hemoglobin (Hb A1C)  manually resulted  -     Referral to Endocrinology; Future  Type 2 diabetes mellitus with diabetic neuropathy, with long-term current use of insulin (CMS/[facility])      Type 2 diabetes mellitus, is at goal. A1C above goal <7%     RX changes: none   Education:  blood sugar goals and nutrition  Follow up: I recommend diabetes care be 1 month.  Diet - please work on eating food in the following order : 1- green vegetables, 2 - protein, 3 - starch/carb.  Please also be careful to select drinks without sugar/carbs

## 2024-02-21 NOTE — PATIENT INSTRUCTIONS
Type 2 diabetes mellitus with hyperglycemia, with long-term current use of insulin (CMS/HCC)  -     metFORMIN XR (Glucophage-XR) 750 mg 24 hr tablet; Take 1 tablet (750 mg) by mouth 2 times a day before meals.  -     pioglitazone (Actos) 15 mg tablet; Take 1 tablet (15 mg) by mouth once daily.  -     Basaglar KwikPen U-100 Insulin 100 unit/mL (3 mL) pen; Inject 30 Units under the skin once daily at bedtime. Take as directed per insulin instructions.  -     POCT glycosylated hemoglobin (Hb A1C) manually resulted  -     Referral to Endocrinology; Future  Type 2 diabetes mellitus with diabetic neuropathy, with long-term current use of insulin (CMS/HCC)      Type 2 diabetes mellitus, is at goal. A1C above goal <7%     RX changes: none   Education:  blood sugar goals and nutrition  Follow up: I recommend diabetes care be 1 month.  Diet - please work on eating food in the following order : 1- green vegetables, 2 - protein, 3 - starch/carb.  Please also be careful to select drinks without sugar/carbs

## 2024-02-28 ENCOUNTER — HOSPITAL ENCOUNTER (OUTPATIENT)
Dept: RADIOLOGY | Facility: HOSPITAL | Age: 74
Discharge: HOME | End: 2024-02-28
Payer: MEDICARE

## 2024-02-28 ENCOUNTER — APPOINTMENT (OUTPATIENT)
Dept: ENDOCRINOLOGY | Facility: CLINIC | Age: 74
End: 2024-02-28
Payer: MEDICARE

## 2024-02-28 DIAGNOSIS — Z79.818 ENCOUNTER FOR MONITORING ANDROGEN DEPRIVATION THERAPY: ICD-10-CM

## 2024-02-28 DIAGNOSIS — C61 PROSTATE CANCER (MULTI): ICD-10-CM

## 2024-02-28 PROCEDURE — 77080 DXA BONE DENSITY AXIAL: CPT

## 2024-02-28 PROCEDURE — 77080 DXA BONE DENSITY AXIAL: CPT | Performed by: RADIOLOGY

## 2024-02-29 ENCOUNTER — OFFICE VISIT (OUTPATIENT)
Dept: HEMATOLOGY/ONCOLOGY | Facility: HOSPITAL | Age: 74
End: 2024-02-29
Payer: MEDICARE

## 2024-02-29 VITALS
TEMPERATURE: 97 F | OXYGEN SATURATION: 96 % | RESPIRATION RATE: 17 BRPM | HEART RATE: 84 BPM | BODY MASS INDEX: 37.4 KG/M2 | SYSTOLIC BLOOD PRESSURE: 152 MMHG | HEIGHT: 66 IN | DIASTOLIC BLOOD PRESSURE: 87 MMHG | WEIGHT: 232.7 LBS

## 2024-02-29 DIAGNOSIS — C61 MALIGNANT NEOPLASM OF PROSTATE (MULTI): ICD-10-CM

## 2024-02-29 PROCEDURE — 99213 OFFICE O/P EST LOW 20 MIN: CPT | Performed by: INTERNAL MEDICINE

## 2024-02-29 PROCEDURE — 1036F TOBACCO NON-USER: CPT | Performed by: INTERNAL MEDICINE

## 2024-02-29 PROCEDURE — 1126F AMNT PAIN NOTED NONE PRSNT: CPT | Performed by: INTERNAL MEDICINE

## 2024-02-29 PROCEDURE — 1159F MED LIST DOCD IN RCRD: CPT | Performed by: INTERNAL MEDICINE

## 2024-02-29 PROCEDURE — 3077F SYST BP >= 140 MM HG: CPT | Performed by: INTERNAL MEDICINE

## 2024-02-29 PROCEDURE — 3079F DIAST BP 80-89 MM HG: CPT | Performed by: INTERNAL MEDICINE

## 2024-02-29 ASSESSMENT — PAIN SCALES - GENERAL: PAINLEVEL: 0-NO PAIN

## 2024-03-01 NOTE — PROGRESS NOTES
" Medical Oncology Out Patient Clinic Note    Patient's Name: Miguelito Edge  MRN: [id number]  Date of Service: 2/29/2024  In- Person Visit: YES    Reason for Visit: FU    HPI: 75 yo male known to us with [facility]  On ADT now with plans to cycle off in August 2024  Feels well  No new symptoms  G1 fatigue and muscle tone loss      Updated PMHx  None    Review of Systems  13 point review negative    Physical Exam:  /87   Pulse 84   Temp 36.1 °C (97 °F) (Skin)   Resp 17   Ht (S) 1.664 m (5' 5.51\")   Wt 106 kg (232 lb 11.2 oz)   SpO2 96%   BMI 38.12 kg/m²   ECOG Performance Status:   HEENT: Normal  ENT: Normal  CV: NA  Pulmonary: NA  GI:NA  :NA  Extremities: No Edema   Neurologic: Normal  Skin: Normal skin turgor  Psych: Appropriate mood      LABS:  Lab Results   Component Value Date    PSA 0.39 02/18/2024    PSA 0.43 11/30/2023    PSA 0.41 08/31/2023     Lab Results   Component Value Date    WBC 6.7 02/18/2024    HGB 13.0 (L) 02/18/2024    HCT 41.3 02/18/2024    MCV 91 02/18/2024     02/18/2024     Lab Results   Component Value Date    GLUCOSE 203 (H) 02/18/2024    CALCIUM 9.8 02/18/2024     02/18/2024    K 3.9 02/18/2024    CO2 26 02/18/2024     02/18/2024    BUN 14 02/18/2024    CREATININE 1.04 02/18/2024     Lab Results   Component Value Date    TESTOSTERONE <30 (L) 02/18/2024     Lab Results   Component Value Date    ALT 12 02/18/2024    AST 15 02/18/2024    ALKPHOS 69 02/18/2024    BILITOT 0.5 02/18/2024       IMAGING:  NA  GENOMICS:  NA    A/P: [facility]    On ADT with serologic response  Will remain on ADT until August 2024 and then break  Next LHRH in May- after T and PSA every 3 months      Discussed importance of a healthier diet - offered to see Nutritional Services; Also discussed the importance of daily regular exercise (aerobic and resistance differences noted)  Offered to see Supportive Services if needed as well as integrative Oncology and Psychosocial Support    Stephen EATON" MD Trent, FACP  Chief, Solid Tumor Oncology Division   Medical Oncology  Professor of Medicine and Urology  /[facility]  [facility]

## 2024-03-15 ENCOUNTER — LAB (OUTPATIENT)
Dept: LAB | Facility: LAB | Age: 74
End: 2024-03-15
Payer: MEDICARE

## 2024-03-15 ENCOUNTER — OFFICE VISIT (OUTPATIENT)
Dept: ENDOCRINOLOGY | Facility: CLINIC | Age: 74
End: 2024-03-15
Payer: MEDICARE

## 2024-03-15 VITALS
HEART RATE: 88 BPM | TEMPERATURE: 97.3 F | BODY MASS INDEX: 37.68 KG/M2 | SYSTOLIC BLOOD PRESSURE: 154 MMHG | DIASTOLIC BLOOD PRESSURE: 90 MMHG | WEIGHT: 230 LBS

## 2024-03-15 DIAGNOSIS — E11.65 TYPE 2 DIABETES MELLITUS WITH HYPERGLYCEMIA, WITH LONG-TERM CURRENT USE OF INSULIN (MULTI): ICD-10-CM

## 2024-03-15 DIAGNOSIS — E03.9 HYPOTHYROIDISM, UNSPECIFIED TYPE: ICD-10-CM

## 2024-03-15 DIAGNOSIS — Z79.4 TYPE 2 DIABETES MELLITUS WITH HYPERGLYCEMIA, WITH LONG-TERM CURRENT USE OF INSULIN (MULTI): ICD-10-CM

## 2024-03-15 DIAGNOSIS — E03.9 HYPOTHYROIDISM, UNSPECIFIED TYPE: Primary | ICD-10-CM

## 2024-03-15 LAB
CHOLEST SERPL-MCNC: 159 MG/DL (ref 0–199)
CHOLESTEROL/HDL RATIO: 3.6
CREAT UR-MCNC: 31.9 MG/DL (ref 20–370)
HDLC SERPL-MCNC: 44 MG/DL
MICROALBUMIN UR-MCNC: <7 MG/L
MICROALBUMIN/CREAT UR: NORMAL MG/G{CREAT}
NON-HDL CHOLESTEROL: 115 MG/DL (ref 0–149)
TSH SERPL-ACNC: 1.93 MIU/L (ref 0.44–3.98)

## 2024-03-15 PROCEDURE — 1159F MED LIST DOCD IN RCRD: CPT | Performed by: NURSE PRACTITIONER

## 2024-03-15 PROCEDURE — 82570 ASSAY OF URINE CREATININE: CPT

## 2024-03-15 PROCEDURE — 1036F TOBACCO NON-USER: CPT | Performed by: NURSE PRACTITIONER

## 2024-03-15 PROCEDURE — 99214 OFFICE O/P EST MOD 30 MIN: CPT | Performed by: NURSE PRACTITIONER

## 2024-03-15 PROCEDURE — 36415 COLL VENOUS BLD VENIPUNCTURE: CPT

## 2024-03-15 PROCEDURE — 82043 UR ALBUMIN QUANTITATIVE: CPT

## 2024-03-15 PROCEDURE — 3077F SYST BP >= 140 MM HG: CPT | Performed by: NURSE PRACTITIONER

## 2024-03-15 PROCEDURE — 82465 ASSAY BLD/SERUM CHOLESTEROL: CPT

## 2024-03-15 PROCEDURE — 83718 ASSAY OF LIPOPROTEIN: CPT

## 2024-03-15 PROCEDURE — 3080F DIAST BP >= 90 MM HG: CPT | Performed by: NURSE PRACTITIONER

## 2024-03-15 PROCEDURE — 84443 ASSAY THYROID STIM HORMONE: CPT

## 2024-03-15 PROCEDURE — 1160F RVW MEDS BY RX/DR IN RCRD: CPT | Performed by: NURSE PRACTITIONER

## 2024-03-15 NOTE — PATIENT INSTRUCTIONS
Plan to get the CGM placed at home  Schedule to come back in 2 weeks with diabetes education     Schedule Pharmacy phone call (they will call you)     Continue all diabetes medications as is for now      Get thyroid labs   Continue levothyroxine 25 mcg once daily     Schedule virtual follow up with me in 3 months

## 2024-03-15 NOTE — PROGRESS NOTES
Subjective   Miguelito Edge is a 74 y.o. male presents today for a follow up of DM Type 2. Initial diagnosis with diabetes was several years ago.   Known complications include: HTN, Hyperlipidemia    Patient of AUBRIE Finn and last seen by her 2/2024   This is the first visit with me  A1c 9.4% in 2/2024.  Previous A1c l9.1% in 11/2023.     Since last visit, he received his CGM but has not started  He had a DEXA and did not want to start  He has a co worker who is willing to show him how to put this on   Denies missing doses of meds         Current diabetes regimen is as follows:   Metformin  mg twice daily   Pioglitazone 15 mg once daily   Jardiance 25 mg once daily - Ecochlor CARES PAP  Trulicity 4.5 mg once weekly - Liana PAP  Basaglar 30 units once daily in the morning  - Liana PAP      Patient is using continuous glucose monitor - Stacie 2 (has not started yet)   The patient is currently checking the blood glucose 1 times per day   Blood sugars reported 200s     Hypoglycemia frequency: Denies   Hypoglycemia awareness: Yes    Regarding symptoms of hyperglycemia, the patient is not experiencing any symptoms such as polyuria, polydipsia, nocturia or rapid weight loss or blurry vision. The patient comes into the office today with hyperglycemia.        ROS  General: no fever, chills or acute changes in weight in the last 6 months  Skin: no rashes, pruritis or dry skin  Cardiac: denies chest pain, heart palpitations or orthopnea  Pulmonary: denies wheezing, productive cough or exertional dyspnea      Objective    Physical Exam  Blood pressure 154/90, pulse 88, temperature 36.3 °C (97.3 °F), weight 104 kg (230 lb).  General: not in acute distress, cooperative   Respiratory: normal respiratory effort  Musculoskeletal: normal gait       Current Outpatient Medications:     atenolol (Tenormin) 50 mg tablet, Take 1 tablet (50 mg) by mouth once daily., Disp: , Rfl:     Basaglar KwikPen U-100 Insulin 100 unit/mL (3 mL) pen,  Inject 30 Units under the skin once daily at bedtime. Take as directed per insulin instructions., Disp: 39 mL, Rfl: 2    blood sugar diagnostic (Accu-Chek Guide test strips) strip, 1 strip 2 times a day., Disp: 200 strip, Rfl: 2    docusate sodium (Colace) 100 mg capsule, Take 1 capsule (100 mg) by mouth once daily as needed., Disp: , Rfl:     dulaglutide (Trulicity) 4.5 mg/0.5 mL pen injector, Inject 4.5 mg under the skin 1 (one) time per week., Disp: 8 mL, Rfl: 2    econazole nitrate 1 % cream, twice a day., Disp: , Rfl:     empagliflozin (Jardiance) 25 mg, Take 1 tablet (25 mg) by mouth once daily in the morning. Take before meals., Disp: 90 tablet, Rfl: 0    ergocalciferol (Vitamin D-2) 1.25 MG (16639 UT) capsule, Take 1 capsule (1,250 mcg) by mouth 1 (one) time per week., Disp: 13 capsule, Rfl: 0    FreeStyle Stacie 2 [place] misc, AS DIRECTED, Disp: , Rfl:     FreeStyle Stacie 2 Sensor kit, AS DIRECTED, Disp: , Rfl:     gabapentin (Neurontin) 400 mg capsule, Take 1 capsule (400 mg) by mouth 3 times a day., Disp: , Rfl:     hydroCHLOROthiazide (HYDRODiuril) 12.5 mg tablet, Take 1 tablet (12.5 mg) by mouth once daily in the morning. Take before meals., Disp: , Rfl:     levothyroxine (Synthroid, Levoxyl) 25 mcg tablet, , Disp: , Rfl:     losartan-hydrochlorothiazide (Hyzaar) 100-25 mg tablet, Take 1 tablet by mouth once daily., Disp: , Rfl:     metFORMIN XR (Glucophage-XR) 750 mg 24 hr tablet, Take 1 tablet (750 mg) by mouth 2 times a day before meals., Disp: 180 tablet, Rfl: 3    omeprazole (PriLOSEC) 40 mg DR capsule, Take 1 capsule (40 mg) by mouth once daily., Disp: , Rfl:     pioglitazone (Actos) 15 mg tablet, Take 1 tablet (15 mg) by mouth once daily., Disp: 90 tablet, Rfl: 3    rosuvastatin (Crestor) 5 mg tablet, Take 1 tablet (5 mg) by mouth once daily at bedtime., Disp: , Rfl:     sildenafil (Viagra) 100 mg tablet, Take by mouth., Disp: , Rfl:     traZODone (Desyrel) 150 mg tablet, Take 1 tablet (150 mg) by  mouth once daily at bedtime., Disp: , Rfl:     Assessment/Plan   Type 2 diabetes with hyperglycemia with long term use of insulin:   - A1c not at goal.  He has received CGM and has not started this yet.  Not enough data today to make changes.  He plans to start CGM and would ask for him to come back to meet with [facility] to identify patterns.  Will adjust meds accordingly.  He is using Liana PAP for insulin and Trulicity.  Would like to switch to  PAP for Mounjaro and insulin based on patterns.  We could also change Jardiance to [facility] right now he is getting through  [facility].      Plan:  Plan to get the CGM placed at home  Schedule to come back in 2 weeks with diabetes education   Schedule Pharmacy phone call (they will call you)   Continue all diabetes medications as is for now    Schedule virtual follow up with me in 3 months      Hypothyroidism:   - No updated TSH on file.  He now taking on a empty stomach,  from his pills.      Plan:   Get thyroid labs   Continue levothyroxine 25 mcg once daily

## 2024-03-18 DIAGNOSIS — Z79.4 TYPE 2 DIABETES MELLITUS WITH HYPERGLYCEMIA, WITH LONG-TERM CURRENT USE OF INSULIN (MULTI): Primary | ICD-10-CM

## 2024-03-18 DIAGNOSIS — E03.9 HYPOTHYROIDISM, UNSPECIFIED TYPE: ICD-10-CM

## 2024-03-18 DIAGNOSIS — E78.5 HYPERLIPIDEMIA, UNSPECIFIED HYPERLIPIDEMIA TYPE: ICD-10-CM

## 2024-03-18 DIAGNOSIS — E11.65 TYPE 2 DIABETES MELLITUS WITH HYPERGLYCEMIA, WITH LONG-TERM CURRENT USE OF INSULIN (MULTI): Primary | ICD-10-CM

## 2024-03-18 RX ORDER — ROSUVASTATIN CALCIUM 10 MG/1
10 TABLET, COATED ORAL NIGHTLY
Qty: 90 TABLET | Refills: 3 | Status: SHIPPED | OUTPATIENT
Start: 2024-03-18

## 2024-03-18 RX ORDER — LEVOTHYROXINE SODIUM 25 UG/1
TABLET ORAL
Qty: 90 TABLET | Refills: 3 | Status: SHIPPED | OUTPATIENT
Start: 2024-03-18

## 2024-04-12 ENCOUNTER — APPOINTMENT (OUTPATIENT)
Dept: PHARMACY | Facility: HOSPITAL | Age: 74
End: 2024-04-12
Payer: MEDICARE

## 2024-04-17 ENCOUNTER — TELEPHONE (OUTPATIENT)
Dept: ENDOCRINOLOGY | Facility: CLINIC | Age: 74
End: 2024-04-17

## 2024-04-17 NOTE — TELEPHONE ENCOUNTER
Pt has scheduled virtual DSME visit today with me, however pt has not joined virtual appointment and calling on phone to determine if patient is having difficulty joining. [place] pt's voicemail inbox message, but did not have the ability to leave a message.     Edi Díaz, ESTELLEN, RN, [facility] [place]     admission

## 2024-05-10 NOTE — PROGRESS NOTES
Patient ID: Miguelito Edge is a 74 y.o. male.  Attending Physician: Dr. Stephen Newman  Cancer Diagnosis: Cancer Staging   No matching staging information was found for the patient.    Current Therapy: ADT (Eligard q12 weeks)    Genetics: Guardant 360  EDNA susceptibility gene  MSI-H not detected    Subjective      Cancer History:  Oncology History    No history exists.     PCa history dates back to 2014 when he was diagnosed with high-risk PCa (bIYN399/cT4/N+/GS7- high volume disease) All baseline scans showed no evidence of distant  disease though he did have LN pelvic disease. At that time the decision was for him to initiate systemic therapy with ADT and received definitive therapy with 81Gy that was completed in the summer of 2014. ADT was completed in 2017.  Restarted ADT in 9/2020 for rising PSA      2014: Initially diagnosed with prostate cancer, high risk, iPSA 636/cT4/N+/GS7, HV disease without evidence of distant mets. Started ADT.  Summer 2014: Underwent definitive XRT with 81Gy  2017: Went off ADT  8/27/2020: PSA 1.86  9/11/2020: Restarted ADT for rising PSA to 2.67    Interval History:  Mr. Edge presents today in follow up on ADT. He hasn't really had any AE's from treatment as of late. He is taking calcium and vitamin D, and lifting weights regularly. He has no new or concerning symptoms at this time. The remainder of his ROS is otherwise negative.    HPI    Objective    BSA: There is no height or weight on file to calculate BSA.  There were no vitals taken for this visit.    Physical Exam  PHYSICAL EXAM:   General: alert, well-dressed in NAD. Speech is fluent and coherent, words clear. Good insight. Oriented x4  Skin: warm, dry, and pink without cyanosis or nail clubbing. No rash, petechiae, or ecchymoses  HEENT: Normocephalic atraumatic. Sclera white, conjunctiva pink. EOMs intact. Hearing intact to spoken voice. No visible lesions  Respiratory: Chest expansion symmetric. No audible wheeze. Unlabored  breathing.  CV: Good color No edema bilaterally.  Psych: engaged, polite, appropriate conversation and eye contact.    Current Medications:    Current Outpatient Medications:     atenolol (Tenormin) 50 mg tablet, Take 1 tablet (50 mg) by mouth once daily., Disp: , Rfl:     Basaglar KwikPen U-100 Insulin 100 unit/mL (3 mL) pen, Inject 30 Units under the skin once daily at bedtime. Take as directed per insulin instructions., Disp: 39 mL, Rfl: 2    blood sugar diagnostic (Accu-Chek Guide test strips) strip, 1 strip 2 times a day., Disp: 200 strip, Rfl: 2    docusate sodium (Colace) 100 mg capsule, Take 1 capsule (100 mg) by mouth once daily as needed., Disp: , Rfl:     dulaglutide (Trulicity) 4.5 mg/0.5 mL pen injector, Inject 4.5 mg under the skin 1 (one) time per week., Disp: 8 mL, Rfl: 2    econazole nitrate 1 % cream, twice a day., Disp: , Rfl:     empagliflozin (Jardiance) 25 mg, Take 1 tablet (25 mg) by mouth once daily in the morning. Take before meals., Disp: 90 tablet, Rfl: 0    ergocalciferol (Vitamin D-2) 1.25 MG (90482 UT) capsule, Take 1 capsule (1,250 mcg) by mouth 1 (one) time per week., Disp: 13 capsule, Rfl: 0    FreeStyle Stacie 2 [place] misc, AS DIRECTED, Disp: , Rfl:     FreeStyle Stacie 2 Sensor kit, AS DIRECTED, Disp: , Rfl:     gabapentin (Neurontin) 400 mg capsule, Take 1 capsule (400 mg) by mouth 3 times a day., Disp: , Rfl:     hydroCHLOROthiazide (HYDRODiuril) 12.5 mg tablet, Take 1 tablet (12.5 mg) by mouth once daily in the morning. Take before meals., Disp: , Rfl:     levothyroxine (Synthroid, Levoxyl) 25 mcg tablet, Take 25 mcg by mouth once daily, Disp: 90 tablet, Rfl: 3    losartan-hydrochlorothiazide (Hyzaar) 100-25 mg tablet, Take 1 tablet by mouth once daily., Disp: , Rfl:     metFORMIN XR (Glucophage-XR) 750 mg 24 hr tablet, Take 1 tablet (750 mg) by mouth 2 times a day before meals., Disp: 180 tablet, Rfl: 3    omeprazole (PriLOSEC) 40 mg DR capsule, Take 1 capsule (40 mg) by mouth  once daily., Disp: , Rfl:     pioglitazone (Actos) 15 mg tablet, Take 1 tablet (15 mg) by mouth once daily., Disp: 90 tablet, Rfl: 3    rosuvastatin (Crestor) 10 mg tablet, Take 1 tablet (10 mg) by mouth once daily at bedtime., Disp: 90 tablet, Rfl: 3    sildenafil (Viagra) 100 mg tablet, Take by mouth., Disp: , Rfl:     traZODone (Desyrel) 150 mg tablet, Take 1 tablet (150 mg) by mouth once daily at bedtime., Disp: , Rfl:      Most Recent Labs:  Results for orders placed or performed in visit on 03/15/24   TSH with reflex to Free T4 if abnormal   Result Value Ref Range    Thyroid Stimulating Hormone 1.93 0.44 - 3.98 mIU/L   Lipid Panel Non-Fasting   Result Value Ref Range    Cholesterol 159 0 - 199 mg/dL    HDL-Cholesterol 44.0 mg/dL    Cholesterol/HDL Ratio 3.6     Non-HDL Cholesterol 115 0 - 149 mg/dL   Albumin , Urine Random   Result Value Ref Range    Albumin, Urine Random <7.0 Not established mg/L    Creatinine, Urine Random 31.9 20.0 - 370.0 mg/dL    Albumin/Creatine Ratio        Lab Results   Component Value Date    PSA 0.39 02/18/2024    PSA 0.43 11/30/2023    PSA 0.41 08/31/2023        Performance Status:  ECOG Score: 0- Fully active, able to carry on all pre-disease performance w/o restriction.  Karnofsky Score: 100 - Fully active, able to carry on all pre-disease performed without restriction      Assessment/Plan   Miguelito Edge is a 74 y.o. male with nmCSPC who presents in follow up on ADT. He is planned for ADT through August 2024, and will see Dr. Newman next visit to discontinue. He looks and feels well. Labs not yet drawn but he will have them drawn today.    # NMCSPC  - OK to continue ADT pending PSA today  - Plan to discontinue August 2024    # EDNA + on Guardant 360  - Discussed referral to genetics, he is agreeable  - Referral to Dr. Irby placed    # Bone Health  - Continue calcium and vitamin D supplementation  - Continue regular, weight-bearing physical activity  - Last DEXA 2/2024, Consider  CAROL 2/2026 if on therapy    # Health Maintenance  - Continue with PCP and other healthcare providers  - Continue exercise, heart-healthy diet    RTC 12 weeks with labs to consider off therapy    Total time spent on this encounter was 30 minutes, which included preparation, direct time with patient, documentation, and care coordination on the day of visit.    Trinity Dillard, MSN, APRN, AGNP-C, AOCNP  Associate Nurse Practitioner  [facility]

## 2024-05-13 ENCOUNTER — INFUSION (OUTPATIENT)
Dept: HEMATOLOGY/ONCOLOGY | Facility: HOSPITAL | Age: 74
End: 2024-05-13
Payer: MEDICARE

## 2024-05-13 ENCOUNTER — LAB (OUTPATIENT)
Dept: LAB | Facility: HOSPITAL | Age: 74
End: 2024-05-13
Payer: MEDICARE

## 2024-05-13 ENCOUNTER — OFFICE VISIT (OUTPATIENT)
Dept: HEMATOLOGY/ONCOLOGY | Facility: HOSPITAL | Age: 74
End: 2024-05-13
Payer: MEDICARE

## 2024-05-13 ENCOUNTER — APPOINTMENT (OUTPATIENT)
Dept: HEMATOLOGY/ONCOLOGY | Facility: HOSPITAL | Age: 74
End: 2024-05-13
Payer: MEDICARE

## 2024-05-13 VITALS
WEIGHT: 236.55 LBS | RESPIRATION RATE: 18 BRPM | HEART RATE: 80 BPM | SYSTOLIC BLOOD PRESSURE: 145 MMHG | DIASTOLIC BLOOD PRESSURE: 83 MMHG | OXYGEN SATURATION: 95 % | BODY MASS INDEX: 38.75 KG/M2 | TEMPERATURE: 97.3 F

## 2024-05-13 DIAGNOSIS — C61 PROSTATE CANCER (MULTI): ICD-10-CM

## 2024-05-13 DIAGNOSIS — E55.9 VITAMIN D DEFICIENCY: ICD-10-CM

## 2024-05-13 DIAGNOSIS — C61 MALIGNANT NEOPLASM OF PROSTATE (MULTI): ICD-10-CM

## 2024-05-13 LAB
25(OH)D3 SERPL-MCNC: 88 NG/ML (ref 30–100)
ALBUMIN SERPL BCP-MCNC: 4.1 G/DL (ref 3.4–5)
ALP SERPL-CCNC: 57 U/L (ref 33–136)
ALT SERPL W P-5'-P-CCNC: 11 U/L (ref 10–52)
ANION GAP SERPL CALC-SCNC: 12 MMOL/L (ref 10–20)
AST SERPL W P-5'-P-CCNC: 15 U/L (ref 9–39)
BASOPHILS # BLD AUTO: 0.02 X10*3/UL (ref 0–0.1)
BASOPHILS NFR BLD AUTO: 0.3 %
BILIRUB SERPL-MCNC: 0.6 MG/DL (ref 0–1.2)
BUN SERPL-MCNC: 14 MG/DL (ref 6–23)
CALCIUM SERPL-MCNC: 10 MG/DL (ref 8.6–10.6)
CHLORIDE SERPL-SCNC: 103 MMOL/L (ref 98–107)
CO2 SERPL-SCNC: 29 MMOL/L (ref 21–32)
CREAT SERPL-MCNC: 1.06 MG/DL (ref 0.5–1.3)
EGFRCR SERPLBLD CKD-EPI 2021: 75 ML/MIN/1.73M*2
EOSINOPHIL # BLD AUTO: 0.13 X10*3/UL (ref 0–0.4)
EOSINOPHIL NFR BLD AUTO: 2.2 %
ERYTHROCYTE [DISTWIDTH] IN BLOOD BY AUTOMATED COUNT: 13.3 % (ref 11.5–14.5)
GLUCOSE SERPL-MCNC: 105 MG/DL (ref 74–99)
HCT VFR BLD AUTO: 40.7 % (ref 41–52)
HGB BLD-MCNC: 13.5 G/DL (ref 13.5–17.5)
IMM GRANULOCYTES # BLD AUTO: 0.02 X10*3/UL (ref 0–0.5)
IMM GRANULOCYTES NFR BLD AUTO: 0.3 % (ref 0–0.9)
LYMPHOCYTES # BLD AUTO: 2.47 X10*3/UL (ref 0.8–3)
LYMPHOCYTES NFR BLD AUTO: 41.8 %
MCH RBC QN AUTO: 28.7 PG (ref 26–34)
MCHC RBC AUTO-ENTMCNC: 33.2 G/DL (ref 32–36)
MCV RBC AUTO: 87 FL (ref 80–100)
MONOCYTES # BLD AUTO: 0.51 X10*3/UL (ref 0.05–0.8)
MONOCYTES NFR BLD AUTO: 8.6 %
NEUTROPHILS # BLD AUTO: 2.76 X10*3/UL (ref 1.6–5.5)
NEUTROPHILS NFR BLD AUTO: 46.8 %
NRBC BLD-RTO: 0 /100 WBCS (ref 0–0)
PLATELET # BLD AUTO: 191 X10*3/UL (ref 150–450)
POTASSIUM SERPL-SCNC: 4.3 MMOL/L (ref 3.5–5.3)
PROT SERPL-MCNC: 7.9 G/DL (ref 6.4–8.2)
PSA SERPL-MCNC: 0.35 NG/ML
RBC # BLD AUTO: 4.7 X10*6/UL (ref 4.5–5.9)
SODIUM SERPL-SCNC: 140 MMOL/L (ref 136–145)
T3 SERPL-MCNC: 142 NG/DL (ref 60–200)
T4 FREE SERPL-MCNC: 1.5 NG/DL (ref 0.78–1.48)
TESTOST SERPL-MCNC: <30 NG/DL (ref 240–1000)
TSH SERPL-ACNC: 0.68 MIU/L (ref 0.44–3.98)
WBC # BLD AUTO: 5.9 X10*3/UL (ref 4.4–11.3)

## 2024-05-13 PROCEDURE — 3062F POS MACROALBUMINURIA REV: CPT | Performed by: NURSE PRACTITIONER

## 2024-05-13 PROCEDURE — 36415 COLL VENOUS BLD VENIPUNCTURE: CPT

## 2024-05-13 PROCEDURE — 3077F SYST BP >= 140 MM HG: CPT | Performed by: NURSE PRACTITIONER

## 2024-05-13 PROCEDURE — 1126F AMNT PAIN NOTED NONE PRSNT: CPT | Performed by: NURSE PRACTITIONER

## 2024-05-13 PROCEDURE — 84153 ASSAY OF PSA TOTAL: CPT

## 2024-05-13 PROCEDURE — 84439 ASSAY OF FREE THYROXINE: CPT | Performed by: PHYSICIAN ASSISTANT

## 2024-05-13 PROCEDURE — 3079F DIAST BP 80-89 MM HG: CPT | Performed by: NURSE PRACTITIONER

## 2024-05-13 PROCEDURE — 84403 ASSAY OF TOTAL TESTOSTERONE: CPT

## 2024-05-13 PROCEDURE — 1159F MED LIST DOCD IN RCRD: CPT | Performed by: NURSE PRACTITIONER

## 2024-05-13 PROCEDURE — 84443 ASSAY THYROID STIM HORMONE: CPT | Performed by: PHYSICIAN ASSISTANT

## 2024-05-13 PROCEDURE — 80053 COMPREHEN METABOLIC PANEL: CPT

## 2024-05-13 PROCEDURE — 99214 OFFICE O/P EST MOD 30 MIN: CPT | Performed by: NURSE PRACTITIONER

## 2024-05-13 PROCEDURE — 96402 CHEMO HORMON ANTINEOPL SQ/IM: CPT

## 2024-05-13 PROCEDURE — 84480 ASSAY TRIIODOTHYRONINE (T3): CPT | Performed by: PHYSICIAN ASSISTANT

## 2024-05-13 PROCEDURE — 85025 COMPLETE CBC W/AUTO DIFF WBC: CPT

## 2024-05-13 PROCEDURE — 82306 VITAMIN D 25 HYDROXY: CPT | Performed by: PHYSICIAN ASSISTANT

## 2024-05-13 PROCEDURE — 1160F RVW MEDS BY RX/DR IN RCRD: CPT | Performed by: NURSE PRACTITIONER

## 2024-05-13 PROCEDURE — 2500000004 HC RX 250 GENERAL PHARMACY W/ HCPCS (ALT 636 FOR OP/ED): Mod: JZ | Performed by: NURSE PRACTITIONER

## 2024-05-13 RX ORDER — EPINEPHRINE 0.3 MG/.3ML
0.3 INJECTION SUBCUTANEOUS EVERY 5 MIN PRN
OUTPATIENT
Start: 2024-08-04

## 2024-05-13 RX ORDER — EPINEPHRINE 0.3 MG/.3ML
0.3 INJECTION SUBCUTANEOUS EVERY 5 MIN PRN
Status: DISCONTINUED | OUTPATIENT
Start: 2024-05-13 | End: 2024-05-13 | Stop reason: HOSPADM

## 2024-05-13 RX ORDER — ALBUTEROL SULFATE 0.83 MG/ML
3 SOLUTION RESPIRATORY (INHALATION) AS NEEDED
Status: DISCONTINUED | OUTPATIENT
Start: 2024-05-13 | End: 2024-05-13 | Stop reason: HOSPADM

## 2024-05-13 RX ORDER — FAMOTIDINE 10 MG/ML
20 INJECTION INTRAVENOUS ONCE AS NEEDED
OUTPATIENT
Start: 2024-08-04

## 2024-05-13 RX ORDER — DIPHENHYDRAMINE HYDROCHLORIDE 50 MG/ML
50 INJECTION INTRAMUSCULAR; INTRAVENOUS AS NEEDED
OUTPATIENT
Start: 2024-08-04

## 2024-05-13 RX ORDER — FAMOTIDINE 10 MG/ML
20 INJECTION INTRAVENOUS ONCE AS NEEDED
Status: DISCONTINUED | OUTPATIENT
Start: 2024-05-13 | End: 2024-05-13 | Stop reason: HOSPADM

## 2024-05-13 RX ORDER — DIPHENHYDRAMINE HYDROCHLORIDE 50 MG/ML
50 INJECTION INTRAMUSCULAR; INTRAVENOUS AS NEEDED
Status: DISCONTINUED | OUTPATIENT
Start: 2024-05-13 | End: 2024-05-13 | Stop reason: HOSPADM

## 2024-05-13 RX ORDER — ALBUTEROL SULFATE 0.83 MG/ML
3 SOLUTION RESPIRATORY (INHALATION) AS NEEDED
OUTPATIENT
Start: 2024-08-04

## 2024-05-13 RX ADMIN — LEUPROLIDE ACETATE 22.5 MG: 22.5 INJECTION, SUSPENSION, EXTENDED RELEASE SUBCUTANEOUS at 10:59

## 2024-05-13 ASSESSMENT — PAIN SCALES - GENERAL: PAINLEVEL: 0-NO PAIN

## 2024-05-14 RX ORDER — ERGOCALCIFEROL 1.25 MG/1
1 CAPSULE ORAL
Qty: 13 CAPSULE | Refills: 3 | Status: SHIPPED | OUTPATIENT
Start: 2024-05-19

## 2024-05-29 ENCOUNTER — APPOINTMENT (OUTPATIENT)
Dept: HEMATOLOGY/ONCOLOGY | Facility: HOSPITAL | Age: 74
End: 2024-05-29
Payer: MEDICARE

## 2024-05-31 ENCOUNTER — TELEMEDICINE (OUTPATIENT)
Dept: PHARMACY | Facility: HOSPITAL | Age: 74
End: 2024-05-31
Payer: MEDICARE

## 2024-05-31 DIAGNOSIS — E11.65 TYPE 2 DIABETES MELLITUS WITH HYPERGLYCEMIA, WITH LONG-TERM CURRENT USE OF INSULIN (MULTI): ICD-10-CM

## 2024-05-31 DIAGNOSIS — Z79.4 TYPE 2 DIABETES MELLITUS WITH HYPERGLYCEMIA, WITH LONG-TERM CURRENT USE OF INSULIN (MULTI): ICD-10-CM

## 2024-05-31 NOTE — PROGRESS NOTES
Subjective   Patient ID: Miguelito Edge is a 74 y.o. male who presents for Diabetes.    Referring Provider: GRISELDA Miller (endocrinologist)    Patient presents to Clinical Pharmacy at request of endocrinologist for cost assistance with transitioning patient from Trulicity to Mounjaro. Patient is currently taking max dose of Trulicity and A1c continues to be elevated.    Glucose Monitoring Regimen:  Patient is using continuous glucose monitor; FreeStyle Stacie 2    Any episodes of hypoglycemia? no    Adverse Effects:   Patient denies any GI adverse effects (Upset stomach/diarrhea/constipation/nausea/vomiting) with Trulicity    Adherence: Good  Affordability/Accessibility  Patient is currently receiving Trulicity and Basaglar through [facility] and Jardiance through [facility].    Diabetes Pharmacotherapy:    Metformin  mg BID  Pioglitazone 15 mg daily  Jardiance 25 mg daily  Trulicity 4.5 mg once weekly (has about 2 months worth left)  Basaglar 30 units QAM    Historical Diabetes Pharmacotherapy:  None    Secondary Prevention  Statin? Yes (Rosuvastatin 10 mg)  ACE-I/ARB? Yes (Losartan/HCTZ 100/25 mg)    Drug Interactions: No significant drug-drug interactions exist that require change in therapy.    Objective     There were no vitals taken for this visit.     Labs  Lab Results   Component Value Date    BILITOT 0.6 05/13/2024    CALCIUM 10.0 05/13/2024    CO2 29 05/13/2024     05/13/2024    CREATININE 1.06 05/13/2024    GLUCOSE 105 (H) 05/13/2024    ALKPHOS 57 05/13/2024    K 4.3 05/13/2024    PROT 7.9 05/13/2024     05/13/2024    AST 15 05/13/2024    ALT 11 05/13/2024    BUN 14 05/13/2024    ANIONGAP 12 05/13/2024     08/26/2021    ALBUMIN 4.1 05/13/2024    GFRMALE 68 08/31/2023     Lab Results   Component Value Date    CHOL 159 03/15/2024    HDL 44.0 03/15/2024     Lab Results   Component Value Date    HGBA1C 9.4 (A) 02/21/2024       Current Outpatient Medications on File Prior to  Visit   Medication Sig Dispense Refill    atenolol (Tenormin) 50 mg tablet Take 1 tablet (50 mg) by mouth once daily.      Basaglar KwikPen U-100 Insulin 100 unit/mL (3 mL) pen Inject 30 Units under the skin once daily at bedtime. Take as directed per insulin instructions. 39 mL 2    blood sugar diagnostic (Accu-Chek Guide test strips) strip 1 strip 2 times a day. 200 strip 2    docusate sodium (Colace) 100 mg capsule Take 1 capsule (100 mg) by mouth once daily as needed.      dulaglutide (Trulicity) 4.5 mg/0.5 mL pen injector Inject 4.5 mg under the skin 1 (one) time per week. 8 mL 2    econazole nitrate 1 % cream twice a day.      empagliflozin (Jardiance) 25 mg Take 1 tablet (25 mg) by mouth once daily in the morning. Take before meals. 90 tablet 0    ergocalciferol (Vitamin D-2) 1.25 MG (28506 UT) capsule Take 1 capsule (1,250 mcg) by mouth 1 (one) time per week. 13 capsule 3    FreeStyle Stacie 2 [place] misc AS DIRECTED      FreeStyle Stacie 2 Sensor kit AS DIRECTED      gabapentin (Neurontin) 400 mg capsule Take 1 capsule (400 mg) by mouth 3 times a day.      hydroCHLOROthiazide (HYDRODiuril) 12.5 mg tablet Take 1 tablet (12.5 mg) by mouth once daily in the morning. Take before meals.      levothyroxine (Synthroid, Levoxyl) 25 mcg tablet Take 25 mcg by mouth once daily 90 tablet 3    losartan-hydrochlorothiazide (Hyzaar) 100-25 mg tablet Take 1 tablet by mouth once daily.      metFORMIN XR (Glucophage-XR) 750 mg 24 hr tablet Take 1 tablet (750 mg) by mouth 2 times a day before meals. 180 tablet 3    omeprazole (PriLOSEC) 40 mg DR capsule Take 1 capsule (40 mg) by mouth once daily.      pioglitazone (Actos) 15 mg tablet Take 1 tablet (15 mg) by mouth once daily. 90 tablet 3    rosuvastatin (Crestor) 10 mg tablet Take 1 tablet (10 mg) by mouth once daily at bedtime. 90 tablet 3    sildenafil (Viagra) 100 mg tablet Take by mouth.      traZODone (Desyrel) 150 mg tablet Take 1 tablet (150 mg) by mouth once daily at  bedtime.       No current facility-administered medications on file prior to visit.        Assessment/Plan   Problem List Items Addressed This Visit             ICD-10-CM    Type 2 diabetes mellitus with hyperglycemia, with long-term current use of insulin (Multi) E11.65, Z79.4             ASSESSMENT  Patients diabetes is poorly controlled with most recent A1c of 9.4% (goal < 7.5%). Glycemic control is estimated to still be not at goal. Patient's SMBG are elevated. Patient has been tolerating current therapy well.  Patient would benefit from transition from Trulicity to Mounjaro for better glycemic control due to novel dual GLP1-GIP RA mechanism.  Discussed receiving medication through [facility] program, which patient should qualify for. Patient agreeable.    PATIENT GOALS   Fasting B - 130 mg/dL 2-HR Postprandial BG:   Less than 180 mg/dL A1c:   Less than 7.0 %     RECOMMENDATIONS/PLAN  Patient to email [facility] requested financial documents for [facility] program application.  Once received, will switch patient from Trulicity to Mounjaro.  CONTINUE all medications for diabetes in the meantime:  Metformin  mg BID  Pioglitazone 15 mg daily  Jardiance 25 mg daily  Trulicity 4.5 mg once weekly  Basaglar 30 units QAM  Follow up: 3 weeks by telephone. Encouraged patient to reach out with any concerns or questions prior to next appointment.             Janice Hossain, PharmD  Clinical Ambulatory Care Pharmacist  Ph: [phone]    Continue all meds under the continuation of care with the referring provider and clinical pharmacy team.    Clinical Pharmacist follow up: 24 or sooner as needed based on clinical intervention  Type of Encounter:  Telephone    Verbal consent to manage patient's drug therapy was obtained from the patient. They were informed they may decline to participate or withdraw from participation in pharmacy services at any time.

## 2024-05-31 NOTE — ASSESSMENT & PLAN NOTE
ASSESSMENT  Patients diabetes is poorly controlled with most recent A1c of 9.4% (goal < 7.5%). Glycemic control is estimated to still be not at goal. Patient's SMBG are elevated. Patient has been tolerating current therapy well.  Patient would benefit from transition from Trulicity to Mounjaro for better glycemic control due to novel dual GLP1-GIP RA mechanism.  Discussed receiving medication through [facility] program, which patient should qualify for. Patient agreeable.    PATIENT GOALS   Fasting B - 130 mg/dL 2-HR Postprandial BG:   Less than 180 mg/dL A1c:   Less than 7.0 %     RECOMMENDATIONS/PLAN  Patient to email [facility] requested financial documents for [facility] program application.  Once received, will switch patient from Trulicity to Mounjaro.  CONTINUE all medications for diabetes in the meantime:  Metformin  mg BID  Pioglitazone 15 mg daily  Jardiance 25 mg daily  Trulicity 4.5 mg once weekly  Basaglar 30 units QAM  Follow up: 3 weeks by telephone. Encouraged patient to reach out with any concerns or questions prior to next appointment.

## 2024-05-31 NOTE — Clinical Note
Once application for  PAP is approved, what dose would you like patient to start on with Mounjaro? I was thinking starting at 10 mg since he has been on max dose Trulicity, however would like your input as well. Thank you!

## 2024-06-07 ENCOUNTER — APPOINTMENT (OUTPATIENT)
Dept: PHARMACY | Facility: HOSPITAL | Age: 74
End: 2024-06-07
Payer: MEDICARE

## 2024-06-17 ENCOUNTER — APPOINTMENT (OUTPATIENT)
Dept: ENDOCRINOLOGY | Facility: CLINIC | Age: 74
End: 2024-06-17
Payer: MEDICARE

## 2024-06-17 DIAGNOSIS — Z79.4 TYPE 2 DIABETES MELLITUS WITH HYPERGLYCEMIA, WITH LONG-TERM CURRENT USE OF INSULIN (MULTI): Primary | ICD-10-CM

## 2024-06-17 DIAGNOSIS — E11.65 TYPE 2 DIABETES MELLITUS WITH HYPERGLYCEMIA, WITH LONG-TERM CURRENT USE OF INSULIN (MULTI): Primary | ICD-10-CM

## 2024-06-17 DIAGNOSIS — E03.9 HYPOTHYROIDISM, UNSPECIFIED TYPE: ICD-10-CM

## 2024-06-17 PROCEDURE — 1159F MED LIST DOCD IN RCRD: CPT | Performed by: NURSE PRACTITIONER

## 2024-06-17 PROCEDURE — 1036F TOBACCO NON-USER: CPT | Performed by: NURSE PRACTITIONER

## 2024-06-17 PROCEDURE — 99214 OFFICE O/P EST MOD 30 MIN: CPT | Performed by: NURSE PRACTITIONER

## 2024-06-17 PROCEDURE — 3062F POS MACROALBUMINURIA REV: CPT | Performed by: NURSE PRACTITIONER

## 2024-06-17 NOTE — Clinical Note
Isabelle Schmitz Oniel Mercedes DO  Resident Physician  [facility]  Otolaryngology Residency  2/15/2023  10:54 AM Patient seen virtually today.  Can you please arrange 3-4 months DM follow up virtually?  Thanks.   Purnima

## 2024-06-17 NOTE — PROGRESS NOTES
Subjective   Miguelito Edge is a 74 y.o. male presents today for a follow up of DM Type 2. Initial diagnosis with diabetes was several years ago.   Known complications include: HTN, Hyperlipidemia    Patient of AUBRIE Finn and last seen by her 2/2024   Previous A1c 9.4% in 2/2024.    Since last visit, he was able to talk with PharmD  Would like to change Trulicity to Mounjaro via  PAP    Could also add in insulin   Having issues with Stacie   Is waiting for replacements   Currently having an issue with poison oak  Swelling of eyes and face, seeing PCP today       Current diabetes regimen is as follows:   Metformin  mg twice daily   Pioglitazone 15 mg once daily   Jardiance 25 mg once daily -  [facility]  Trulicity 4.5 mg once weekly - [facility]  Basaglar 30 units once daily in the morning  - [facility]      Patient is using continuous glucose monitor - Stacie 2 (has not used since May)  The patient is currently checking the blood glucose 2 times per day   He reports:       Hypoglycemia frequency: Denies  Hypoglycemia awareness: Yes    The patient comes into the office today without concerns.  He feels he is spending more time in range.         ROS  General: no fever, chills or acute changes in weight in the last 6 months  Skin: no rashes, pruritis or dry skin  Cardiac: denies chest pain, heart palpitations or orthopnea  Pulmonary: denies wheezing, productive cough or exertional dyspnea      Objective    Physical Exam  Unable to obtain blood pressure today due to virtual visit  General Appearance: Well appearing, alert, in no acute distress, well-hydrated, well nourished.  Affect: alert, cooperative         Current Outpatient Medications:     atenolol (Tenormin) 50 mg tablet, Take 1 tablet (50 mg) by mouth once daily., Disp: , Rfl:     Basaglar KwikPen U-100 Insulin 100 unit/mL (3 mL) pen, Inject 30 Units under the skin once daily at bedtime. Take as directed per insulin instructions., Disp: 39 mL, Rfl:  2    blood sugar diagnostic (Accu-Chek Guide test strips) strip, 1 strip 2 times a day., Disp: 200 strip, Rfl: 2    docusate sodium (Colace) 100 mg capsule, Take 1 capsule (100 mg) by mouth once daily as needed., Disp: , Rfl:     dulaglutide (Trulicity) 4.5 mg/0.5 mL pen injector, Inject 4.5 mg under the skin 1 (one) time per week., Disp: 8 mL, Rfl: 2    econazole nitrate 1 % cream, twice a day., Disp: , Rfl:     empagliflozin (Jardiance) 25 mg, Take 1 tablet (25 mg) by mouth once daily in the morning. Take before meals., Disp: 90 tablet, Rfl: 0    ergocalciferol (Vitamin D-2) 1.25 MG (68478 UT) capsule, Take 1 capsule (1,250 mcg) by mouth 1 (one) time per week., Disp: 13 capsule, Rfl: 3    FreeStyle Stacie 2 [place] misc, AS DIRECTED, Disp: , Rfl:     FreeStyle Stacie 2 Sensor kit, AS DIRECTED, Disp: , Rfl:     gabapentin (Neurontin) 400 mg capsule, Take 1 capsule (400 mg) by mouth 3 times a day., Disp: , Rfl:     hydroCHLOROthiazide (HYDRODiuril) 12.5 mg tablet, Take 1 tablet (12.5 mg) by mouth once daily in the morning. Take before meals., Disp: , Rfl:     levothyroxine (Synthroid, Levoxyl) 25 mcg tablet, Take 25 mcg by mouth once daily, Disp: 90 tablet, Rfl: 3    losartan-hydrochlorothiazide (Hyzaar) 100-25 mg tablet, Take 1 tablet by mouth once daily., Disp: , Rfl:     metFORMIN XR (Glucophage-XR) 750 mg 24 hr tablet, Take 1 tablet (750 mg) by mouth 2 times a day before meals., Disp: 180 tablet, Rfl: 3    omeprazole (PriLOSEC) 40 mg DR capsule, Take 1 capsule (40 mg) by mouth once daily., Disp: , Rfl:     pioglitazone (Actos) 15 mg tablet, Take 1 tablet (15 mg) by mouth once daily., Disp: 90 tablet, Rfl: 3    rosuvastatin (Crestor) 10 mg tablet, Take 1 tablet (10 mg) by mouth once daily at bedtime., Disp: 90 tablet, Rfl: 3    sildenafil (Viagra) 100 mg tablet, Take by mouth., Disp: , Rfl:     traZODone (Desyrel) 150 mg tablet, Take 1 tablet (150 mg) by mouth once daily at bedtime., Disp: , Rfl:     Assessment/Plan    Type 2 diabetes with hyperglycemia with long term use of insulin:   -A1c not at goal.  He has met with Pharm.D. to adjust patient assistance and get on  PAP.  I would like to change his Trulicity to Mounjaro.  For now he can continue Jardiance from [facility] but eventually could switch over to  PAP.  Continue appointments with Pharm.D.  Not enough data today as he has been without sensors.  Now connected to the office.  Will look at readings again in a couple weeks hopefully he will get replacement geni's.  He was going to call [employer] for replacements.  TIR was at goal but only 19% usage.        Plan:  Continue metformin and pioglitazone  Continue Jardiance  Continue Trulicity 4.5 mg once weekly for now then switch to Mounjaro once approved for  PAP  Continue Basaglar 30 units once daily  Continue geni 2  Follow-up in 3 to 4 months virtually    Hypothyroidism:   -TSH in range.  Clinically and biochemically euthyroid    Plan:   Continue levothyroxine 25 mcg once daily

## 2024-06-20 ENCOUNTER — APPOINTMENT (OUTPATIENT)
Dept: PHARMACY | Facility: HOSPITAL | Age: 74
End: 2024-06-20
Payer: MEDICARE

## 2024-06-20 DIAGNOSIS — E11.65 TYPE 2 DIABETES MELLITUS WITH HYPERGLYCEMIA, WITH LONG-TERM CURRENT USE OF INSULIN (MULTI): ICD-10-CM

## 2024-06-20 DIAGNOSIS — E11.40 TYPE 2 DIABETES MELLITUS WITH DIABETIC NEUROPATHY, WITH LONG-TERM CURRENT USE OF INSULIN (MULTI): ICD-10-CM

## 2024-06-20 DIAGNOSIS — Z79.4 TYPE 2 DIABETES MELLITUS WITH DIABETIC NEUROPATHY, WITH LONG-TERM CURRENT USE OF INSULIN (MULTI): ICD-10-CM

## 2024-06-20 DIAGNOSIS — Z79.4 TYPE 2 DIABETES MELLITUS WITH HYPERGLYCEMIA, WITH LONG-TERM CURRENT USE OF INSULIN (MULTI): ICD-10-CM

## 2024-06-20 PROCEDURE — RXMED WILLOW AMBULATORY MEDICATION CHARGE

## 2024-06-20 RX ORDER — METFORMIN HYDROCHLORIDE 750 MG/1
750 TABLET, EXTENDED RELEASE ORAL
Qty: 180 TABLET | Refills: 3 | Status: SHIPPED | OUTPATIENT
Start: 2024-06-20 | End: 2025-06-20

## 2024-06-20 RX ORDER — BLOOD SUGAR DIAGNOSTIC
STRIP MISCELLANEOUS
Qty: 100 EACH | Refills: 3 | Status: SHIPPED | OUTPATIENT
Start: 2024-06-20

## 2024-06-20 RX ORDER — INSULIN GLARGINE 100 [IU]/ML
30 INJECTION, SOLUTION SUBCUTANEOUS NIGHTLY
Qty: 30 ML | Refills: 3 | Status: SHIPPED | OUTPATIENT
Start: 2024-06-20 | End: 2025-06-20

## 2024-06-20 RX ORDER — PIOGLITAZONEHYDROCHLORIDE 15 MG/1
15 TABLET ORAL DAILY
Qty: 90 TABLET | Refills: 3 | Status: SHIPPED | OUTPATIENT
Start: 2024-06-20 | End: 2025-06-20

## 2024-06-20 NOTE — PROGRESS NOTES
Clinical Pharmacy Team contacted Miguelito Edge  regarding a consultation for diabetes management thanks to a referral from a referral from Purnima Bermudez CNP. Below is a summary of our conversation and recommendations:    ________________________________________________________________________      Allergies   Allergen Reactions    Nsaids (Non-Steroidal Anti-Inflammatory Drug) Unknown       Objective     There were no vitals taken for this visit.    Diabetes Pharmacotherapy:    metformin xr 750 twice daily  Pioglitazone 15 mg once daily  Jardiance 25 mg once daily  Trulicity 4.5 mg once weekly   Basaglar 30 units once daily      Lab Review  Lab Results   Component Value Date    BILITOT 0.6 05/13/2024    CALCIUM 10.0 05/13/2024    CO2 29 05/13/2024     05/13/2024    CREATININE 1.06 05/13/2024    GLUCOSE 105 (H) 05/13/2024    ALKPHOS 57 05/13/2024    K 4.3 05/13/2024    PROT 7.9 05/13/2024     05/13/2024    AST 15 05/13/2024    ALT 11 05/13/2024    BUN 14 05/13/2024    ANIONGAP 12 05/13/2024     08/26/2021    ALBUMIN 4.1 05/13/2024    GFRMALE 68 08/31/2023     Lab Results   Component Value Date    CHOL 159 03/15/2024    HDL 44.0 03/15/2024     Lab Results   Component Value Date    HGBA1C 9.4 (A) 02/21/2024    HGBA1C 9.1 (A) 11/08/2023    HGBA1C 13.2 09/30/2020     The 10-year ASCVD risk score (Sd DK, et al., 2019) is: 41.2%    Values used to calculate the score:      Age: 74 years      Sex: Male      Is Non- : Yes      Diabetic: Yes      Tobacco smoker: No      Systolic Blood Pressure: 145 mmHg      Is BP treated: Yes      HDL Cholesterol: 44 mg/dL      Total Cholesterol: 159 mg/dL      Assessment/Plan     The patient reports today for a diabetes consultation. Discussed DM regimen with the patient. Current A1c not at goal. Recommend switching trulicity to mounjaro to achieve tighter glycemic control and further weight loss. Discussed side effects and MOA of medication.  Patient was agreeable to this plan. Will help with financial assistance.         PATIENT EDUCATION/GOALS      Goals  Fasting B - 130 mg/dL  Postprandial BG: less than 180 mg/dL  A1c: less than 7%    Provided counseling on lifestyle modifications, medications, and self-monitoring. Patient has no additional questions at this time. Pharmacy to follow up in 3-4 weeks. Please reach out with any questions. Thank you.       Chyna Cueto PharmD    Continue all meds under the continuation of care with the referring provider and clinical pharmacy team.

## 2024-06-21 ENCOUNTER — PHARMACY VISIT (OUTPATIENT)
Dept: PHARMACY | Facility: CLINIC | Age: 74
End: 2024-06-21
Payer: MEDICARE

## 2024-07-24 ENCOUNTER — APPOINTMENT (OUTPATIENT)
Dept: PHARMACY | Facility: HOSPITAL | Age: 74
End: 2024-07-24
Payer: MEDICARE

## 2024-07-24 DIAGNOSIS — Z79.4 TYPE 2 DIABETES MELLITUS WITH HYPERGLYCEMIA, WITH LONG-TERM CURRENT USE OF INSULIN (MULTI): ICD-10-CM

## 2024-07-24 DIAGNOSIS — E11.65 TYPE 2 DIABETES MELLITUS WITH HYPERGLYCEMIA, WITH LONG-TERM CURRENT USE OF INSULIN (MULTI): ICD-10-CM

## 2024-07-24 RX ORDER — TIRZEPATIDE 5 MG/.5ML
5 INJECTION, SOLUTION SUBCUTANEOUS
Qty: 2 ML | Refills: 6 | Status: SHIPPED | OUTPATIENT
Start: 2024-07-28

## 2024-07-24 NOTE — PROGRESS NOTES
Clinical Pharmacy Team contacted Miguelito Edge  regarding a consultation for diabetes management thanks to a referral from a referral from Purnima Bermudez CNP. Below is a summary of our conversation and recommendations:    ________________________________________________________________________      Allergies   Allergen Reactions    Nsaids (Non-Steroidal Anti-Inflammatory Drug) Unknown       Objective     There were no vitals taken for this visit.    Diabetes Pharmacotherapy:    metformin xr 750 twice daily  Pioglitazone 15 mg once daily  Jardiance 25 mg once daily  Trulicity 4.5 mg once weekly   Basaglar 30 units once daily      Lab Review  Lab Results   Component Value Date    BILITOT 0.6 05/13/2024    CALCIUM 10.0 05/13/2024    CO2 29 05/13/2024     05/13/2024    CREATININE 1.06 05/13/2024    GLUCOSE 105 (H) 05/13/2024    ALKPHOS 57 05/13/2024    K 4.3 05/13/2024    PROT 7.9 05/13/2024     05/13/2024    AST 15 05/13/2024    ALT 11 05/13/2024    BUN 14 05/13/2024    ANIONGAP 12 05/13/2024     08/26/2021    ALBUMIN 4.1 05/13/2024    GFRMALE 68 08/31/2023     Lab Results   Component Value Date    CHOL 159 03/15/2024    HDL 44.0 03/15/2024     Lab Results   Component Value Date    HGBA1C 9.4 (A) 02/21/2024    HGBA1C 9.1 (A) 11/08/2023    HGBA1C 13.2 09/30/2020     The 10-year ASCVD risk score (Sd DK, et al., 2019) is: 41.2%    Values used to calculate the score:      Age: 74 years      Sex: Male      Is Non- : Yes      Diabetic: Yes      Tobacco smoker: No      Systolic Blood Pressure: 145 mmHg      Is BP treated: Yes      HDL Cholesterol: 44 mg/dL      Total Cholesterol: 159 mg/dL          Assessment/Plan     The patient reports today for a diabetes consultation. Discussed DM regimen with the patient. TIR is at goal. Patient reports cutting down on carbs to a great extent. Patient was interested in trialing mounjaro to potentially lose more weight and lower insulin  use. This is appropriate given his current BMI of 38.75 as listed in his chart. Discussed therapy with the patient. Patient to stop trulciity and start mounjaro 5 mg subcutaneous once weekly. Pateint was instructed to decrease lantus by 2 units every 3 days if experiancing hypoglycemia. Answered all patient questions. Patient was agreeable to this.     PATIENT EDUCATION/GOALS      Goals  Fasting B - 130 mg/dL  Postprandial BG: less than 180 mg/dL  A1c: less than 7%    Provided counseling on lifestyle modifications, medications, and self-monitoring. Patient has no additional questions at this time. Pharmacy to follow up in 4-6 weeks. Please reach out with any questions. Thank you.       Chyna Cueto, PharmD    Continue all meds under the continuation of care with the referring provider and clinical pharmacy team.

## 2024-07-29 PROCEDURE — RXMED WILLOW AMBULATORY MEDICATION CHARGE

## 2024-07-30 ENCOUNTER — APPOINTMENT (OUTPATIENT)
Dept: GENETICS | Facility: CLINIC | Age: 74
End: 2024-07-30
Payer: MEDICARE

## 2024-08-02 ENCOUNTER — PHARMACY VISIT (OUTPATIENT)
Dept: PHARMACY | Facility: CLINIC | Age: 74
End: 2024-08-02
Payer: MEDICARE

## 2024-08-05 ENCOUNTER — APPOINTMENT (OUTPATIENT)
Dept: HEMATOLOGY/ONCOLOGY | Facility: HOSPITAL | Age: 74
End: 2024-08-05
Payer: MEDICARE

## 2024-08-08 ENCOUNTER — LAB (OUTPATIENT)
Dept: LAB | Facility: HOSPITAL | Age: 74
End: 2024-08-08
Payer: MEDICARE

## 2024-08-08 ENCOUNTER — APPOINTMENT (OUTPATIENT)
Dept: HEMATOLOGY/ONCOLOGY | Facility: HOSPITAL | Age: 74
End: 2024-08-08
Payer: MEDICARE

## 2024-08-08 ENCOUNTER — OFFICE VISIT (OUTPATIENT)
Dept: HEMATOLOGY/ONCOLOGY | Facility: HOSPITAL | Age: 74
End: 2024-08-08
Payer: MEDICARE

## 2024-08-08 VITALS
TEMPERATURE: 96.3 F | BODY MASS INDEX: 39.45 KG/M2 | OXYGEN SATURATION: 94 % | RESPIRATION RATE: 17 BRPM | SYSTOLIC BLOOD PRESSURE: 142 MMHG | HEART RATE: 76 BPM | DIASTOLIC BLOOD PRESSURE: 71 MMHG | WEIGHT: 240.8 LBS

## 2024-08-08 DIAGNOSIS — C61 MALIGNANT NEOPLASM OF PROSTATE (MULTI): ICD-10-CM

## 2024-08-08 DIAGNOSIS — E78.5 HYPERLIPIDEMIA, UNSPECIFIED HYPERLIPIDEMIA TYPE: ICD-10-CM

## 2024-08-08 DIAGNOSIS — E11.9 TYPE 2 DIABETES MELLITUS WITHOUT COMPLICATION, WITH LONG-TERM CURRENT USE OF INSULIN (MULTI): ICD-10-CM

## 2024-08-08 DIAGNOSIS — C61 PROSTATE CANCER (MULTI): ICD-10-CM

## 2024-08-08 DIAGNOSIS — Z79.4 TYPE 2 DIABETES MELLITUS WITHOUT COMPLICATION, WITH LONG-TERM CURRENT USE OF INSULIN (MULTI): ICD-10-CM

## 2024-08-08 LAB
ALBUMIN SERPL BCP-MCNC: 4.3 G/DL (ref 3.4–5)
ALP SERPL-CCNC: 60 U/L (ref 33–136)
ALT SERPL W P-5'-P-CCNC: 11 U/L (ref 10–52)
ANION GAP SERPL CALC-SCNC: 13 MMOL/L (ref 10–20)
AST SERPL W P-5'-P-CCNC: 15 U/L (ref 9–39)
BASOPHILS # BLD AUTO: 0.04 X10*3/UL (ref 0–0.1)
BASOPHILS NFR BLD AUTO: 0.6 %
BILIRUB SERPL-MCNC: 0.6 MG/DL (ref 0–1.2)
BUN SERPL-MCNC: 16 MG/DL (ref 6–23)
CALCIUM SERPL-MCNC: 9.7 MG/DL (ref 8.6–10.3)
CHLORIDE SERPL-SCNC: 103 MMOL/L (ref 98–107)
CHOLEST SERPL-MCNC: 161 MG/DL (ref 0–199)
CHOLESTEROL/HDL RATIO: 3.4
CO2 SERPL-SCNC: 30 MMOL/L (ref 21–32)
CREAT SERPL-MCNC: 1.17 MG/DL (ref 0.5–1.3)
CREAT UR-MCNC: 69.8 MG/DL (ref 20–370)
EGFRCR SERPLBLD CKD-EPI 2021: 66 ML/MIN/1.73M*2
EOSINOPHIL # BLD AUTO: 0.17 X10*3/UL (ref 0–0.4)
EOSINOPHIL NFR BLD AUTO: 2.6 %
ERYTHROCYTE [DISTWIDTH] IN BLOOD BY AUTOMATED COUNT: 13.3 % (ref 11.5–14.5)
EST. AVERAGE GLUCOSE BLD GHB EST-MCNC: 137 MG/DL
GLUCOSE SERPL-MCNC: 138 MG/DL (ref 74–99)
HBA1C MFR BLD: 6.4 %
HCT VFR BLD AUTO: 42.7 % (ref 41–52)
HDLC SERPL-MCNC: 47.2 MG/DL
HGB BLD-MCNC: 13.6 G/DL (ref 13.5–17.5)
IMM GRANULOCYTES # BLD AUTO: 0.02 X10*3/UL (ref 0–0.5)
IMM GRANULOCYTES NFR BLD AUTO: 0.3 % (ref 0–0.9)
LDLC SERPL CALC-MCNC: 79 MG/DL
LYMPHOCYTES # BLD AUTO: 2.2 X10*3/UL (ref 0.8–3)
LYMPHOCYTES NFR BLD AUTO: 33.4 %
MCH RBC QN AUTO: 28.7 PG (ref 26–34)
MCHC RBC AUTO-ENTMCNC: 31.9 G/DL (ref 32–36)
MCV RBC AUTO: 90 FL (ref 80–100)
MICROALBUMIN UR-MCNC: <7 MG/L
MICROALBUMIN/CREAT UR: NORMAL MG/G{CREAT}
MONOCYTES # BLD AUTO: 0.53 X10*3/UL (ref 0.05–0.8)
MONOCYTES NFR BLD AUTO: 8.1 %
NEUTROPHILS # BLD AUTO: 3.62 X10*3/UL (ref 1.6–5.5)
NEUTROPHILS NFR BLD AUTO: 55 %
NON HDL CHOLESTEROL: 114 MG/DL (ref 0–149)
NRBC BLD-RTO: 0 /100 WBCS (ref 0–0)
PLATELET # BLD AUTO: 200 X10*3/UL (ref 150–450)
POTASSIUM SERPL-SCNC: 4.6 MMOL/L (ref 3.5–5.3)
PROT SERPL-MCNC: 7.4 G/DL (ref 6.4–8.2)
PSA SERPL-MCNC: 0.33 NG/ML
RBC # BLD AUTO: 4.74 X10*6/UL (ref 4.5–5.9)
SODIUM SERPL-SCNC: 141 MMOL/L (ref 136–145)
TESTOST SERPL-MCNC: <30 NG/DL (ref 240–1000)
TRIGL SERPL-MCNC: 174 MG/DL (ref 0–149)
VLDL: 35 MG/DL (ref 0–40)
WBC # BLD AUTO: 6.6 X10*3/UL (ref 4.4–11.3)

## 2024-08-08 PROCEDURE — 99214 OFFICE O/P EST MOD 30 MIN: CPT | Performed by: INTERNAL MEDICINE

## 2024-08-08 PROCEDURE — 84153 ASSAY OF PSA TOTAL: CPT

## 2024-08-08 PROCEDURE — 1126F AMNT PAIN NOTED NONE PRSNT: CPT | Performed by: INTERNAL MEDICINE

## 2024-08-08 PROCEDURE — 3062F POS MACROALBUMINURIA REV: CPT | Performed by: INTERNAL MEDICINE

## 2024-08-08 PROCEDURE — 1159F MED LIST DOCD IN RCRD: CPT | Performed by: INTERNAL MEDICINE

## 2024-08-08 PROCEDURE — 3077F SYST BP >= 140 MM HG: CPT | Performed by: INTERNAL MEDICINE

## 2024-08-08 PROCEDURE — 84403 ASSAY OF TOTAL TESTOSTERONE: CPT

## 2024-08-08 PROCEDURE — 80053 COMPREHEN METABOLIC PANEL: CPT

## 2024-08-08 PROCEDURE — 82043 UR ALBUMIN QUANTITATIVE: CPT

## 2024-08-08 PROCEDURE — 83036 HEMOGLOBIN GLYCOSYLATED A1C: CPT

## 2024-08-08 PROCEDURE — 3048F LDL-C <100 MG/DL: CPT | Performed by: INTERNAL MEDICINE

## 2024-08-08 PROCEDURE — 85025 COMPLETE CBC W/AUTO DIFF WBC: CPT

## 2024-08-08 PROCEDURE — 83718 ASSAY OF LIPOPROTEIN: CPT

## 2024-08-08 PROCEDURE — 82570 ASSAY OF URINE CREATININE: CPT

## 2024-08-08 PROCEDURE — 3078F DIAST BP <80 MM HG: CPT | Performed by: INTERNAL MEDICINE

## 2024-08-08 PROCEDURE — 3044F HG A1C LEVEL LT 7.0%: CPT | Performed by: INTERNAL MEDICINE

## 2024-08-08 PROCEDURE — 36415 COLL VENOUS BLD VENIPUNCTURE: CPT

## 2024-08-08 ASSESSMENT — PAIN SCALES - GENERAL: PAINLEVEL: 0-NO PAIN

## 2024-08-08 NOTE — PROGRESS NOTES
Patient ID: Miguelito Edge is a 74 y.o. male.  Attending Physician: Dr. Stephen Newman  Cancer Diagnosis:  Cancer Staging   No matching staging information was found for the patient.    Current Therapy: ADT (Eligard q12 weeks)    Genetics: Guardant 360  EDNA susceptibility gene  MSI-H not detected    Subjective      Cancer History:  Oncology History    No history exists.     PCa history dates back to 2014 when he was diagnosed with high-risk PCa ([id number]/cT4/N+/GS7- high volume disease) All baseline scans showed no evidence of distant  disease though he did have LN pelvic disease. At that time the decision was for him to initiate systemic therapy with ADT and received definitive therapy with 81Gy that was completed in the summer of 2014. ADT was completed in 2017.  Restarted ADT in 9/2020 for rising PSA      2014: Initially diagnosed with prostate cancer, high risk, iPSA 636/cT4/N+/GS7, HV disease without evidence of distant mets. Started ADT.  Summer 2014: Underwent definitive XRT with 81Gy  2017: Went off ADT  8/27/2020: PSA 1.86  9/11/2020: Restarted ADT for rising PSA to 2.67  Last received leuprolide 22.5 mg on 5/13/24    Interval History:  Mr. Edge presents today in follow up on ADT. Patient has no complaints today and is in very good spirits. No AE from ADT.    HPI    Objective    BSA: 2.24 meters squared  /71 (BP Location: Right arm, Patient Position: Sitting)   Pulse 76   Temp 35.7 °C (96.3 °F) (Temporal)   Resp 17   Wt 109 kg (240 lb 12.8 oz)   SpO2 94%   BMI 39.45 kg/m²     Physical Exam  PHYSICAL EXAM:   General: alert, well-dressed in NAD. Speech is fluent and coherent, words clear. Good insight. Oriented x4  Skin: warm, dry, and pink without cyanosis or nail clubbing. No rash, petechiae, or ecchymoses  HEENT: Normocephalic atraumatic. Sclera white, conjunctiva pink. EOMs intact. Hearing intact to spoken voice. No visible lesions  Respiratory: Chest expansion symmetric. No audible wheeze.  "Unlabored breathing.  CV: Good color No edema bilaterally.  Psych: engaged, polite, appropriate conversation and eye contact.    Current Medications:    Current Outpatient Medications:     atenolol (Tenormin) 50 mg tablet, Take 1 tablet (50 mg) by mouth once daily., Disp: , Rfl:     blood sugar diagnostic (Accu-Chek Guide test strips) strip, 1 strip 2 times a day., Disp: 200 strip, Rfl: 2    docusate sodium (Colace) 100 mg capsule, Take 1 capsule (100 mg) by mouth once daily as needed., Disp: , Rfl:     econazole nitrate 1 % cream, twice a day., Disp: , Rfl:     empagliflozin (Jardiance) 25 mg, Take 1 tablet (25 mg) by mouth once daily in the morning. Take before meals., Disp: 90 tablet, Rfl: 2    ergocalciferol (Vitamin D-2) 1.25 MG (52104 UT) capsule, Take 1 capsule (1,250 mcg) by mouth 1 (one) time per week., Disp: 13 capsule, Rfl: 3    FreeStyle Stacei 2 [place] misc, AS DIRECTED, Disp: , Rfl:     FreeStyle Stacie 2 Sensor kit, AS DIRECTED, Disp: , Rfl:     gabapentin (Neurontin) 400 mg capsule, Take 1 capsule (400 mg) by mouth 3 times a day., Disp: , Rfl:     hydroCHLOROthiazide (HYDRODiuril) 12.5 mg tablet, Take 1 tablet (12.5 mg) by mouth once daily in the morning. Take before meals., Disp: , Rfl:     insulin glargine (Basaglar KwikPen U-100 Insulin) 100 unit/mL (3 mL) pen, Inject 30 Units under the skin once daily at bedtime. Take as directed per insulin instructions., Disp: 30 mL, Rfl: 3    levothyroxine (Synthroid, Levoxyl) 25 mcg tablet, Take 25 mcg by mouth once daily, Disp: 90 tablet, Rfl: 3    losartan-hydrochlorothiazide (Hyzaar) 100-25 mg tablet, Take 1 tablet by mouth once daily., Disp: , Rfl:     metFORMIN XR (Glucophage-XR) 750 mg 24 hr tablet, Take 1 tablet (750 mg) by mouth 2 times a day before meals., Disp: 180 tablet, Rfl: 3    omeprazole (PriLOSEC) 40 mg DR capsule, Take 1 capsule (40 mg) by mouth once daily., Disp: , Rfl:     pen needle, diabetic (BD Ultra-Fine Micro Pen Needle) 32 gauge x 1/4\" " needle, Use as directed to give insulin once a day, Disp: 100 each, Rfl: 3    pioglitazone (Actos) 15 mg tablet, Take 1 tablet (15 mg) by mouth once daily., Disp: 90 tablet, Rfl: 3    rosuvastatin (Crestor) 10 mg tablet, Take 1 tablet (10 mg) by mouth once daily at bedtime., Disp: 90 tablet, Rfl: 3    sildenafil (Viagra) 100 mg tablet, Take by mouth., Disp: , Rfl:     tirzepatide (Mounjaro) 5 mg/0.5 mL pen injector, Inject 5 mg under the skin 1 (one) time per week., Disp: 2 mL, Rfl: 6    traZODone (Desyrel) 150 mg tablet, Take 1 tablet (150 mg) by mouth once daily at bedtime., Disp: , Rfl:      Most Recent Labs:  Results for orders placed or performed in visit on 08/08/24   Comprehensive Metabolic Panel   Result Value Ref Range    Glucose 138 (H) 74 - 99 mg/dL    Sodium 141 136 - 145 mmol/L    Potassium 4.6 3.5 - 5.3 mmol/L    Chloride 103 98 - 107 mmol/L    Bicarbonate 30 21 - 32 mmol/L    Anion Gap 13 10 - 20 mmol/L    Urea Nitrogen 16 6 - 23 mg/dL    Creatinine 1.17 0.50 - 1.30 mg/dL    eGFR 66 >60 mL/min/1.73m*2    Calcium 9.7 8.6 - 10.3 mg/dL    Albumin 4.3 3.4 - 5.0 g/dL    Alkaline Phosphatase 60 33 - 136 U/L    Total Protein 7.4 6.4 - 8.2 g/dL    AST 15 9 - 39 U/L    Bilirubin, Total 0.6 0.0 - 1.2 mg/dL    ALT 11 10 - 52 U/L   CBC and Auto Differential   Result Value Ref Range    WBC 6.6 4.4 - 11.3 x10*3/uL    nRBC 0.0 0.0 - 0.0 /100 WBCs    RBC 4.74 4.50 - 5.90 x10*6/uL    Hemoglobin 13.6 13.5 - 17.5 g/dL    Hematocrit 42.7 41.0 - 52.0 %    MCV 90 80 - 100 fL    MCH 28.7 26.0 - 34.0 pg    MCHC 31.9 (L) 32.0 - 36.0 g/dL    RDW 13.3 11.5 - 14.5 %    Platelets 200 150 - 450 x10*3/uL    Neutrophils % 55.0 40.0 - 80.0 %    Immature Granulocytes %, Automated 0.3 0.0 - 0.9 %    Lymphocytes % 33.4 13.0 - 44.0 %    Monocytes % 8.1 2.0 - 10.0 %    Eosinophils % 2.6 0.0 - 6.0 %    Basophils % 0.6 0.0 - 2.0 %    Neutrophils Absolute 3.62 1.60 - 5.50 x10*3/uL    Immature Granulocytes Absolute, Automated 0.02 0.00 - 0.50  x10*3/uL    Lymphocytes Absolute 2.20 0.80 - 3.00 x10*3/uL    Monocytes Absolute 0.53 0.05 - 0.80 x10*3/uL    Eosinophils Absolute 0.17 0.00 - 0.40 x10*3/uL    Basophils Absolute 0.04 0.00 - 0.10 x10*3/uL      Lab Results   Component Value Date    PSA 0.35 05/13/2024    PSA 0.39 02/18/2024    PSA 0.43 11/30/2023        Performance Status:  ECOG Score: 0- Fully active, able to carry on all pre-disease performance w/o restriction.  Karnofsky Score: 100 - Fully active, able to carry on all pre-disease performed without restriction      Assessment/Plan   Miguelito Edge is a 74 y.o. male with nmCSPC who presents in follow up on ADT since Sep 2020. Plan is to discontinue ADT today (completion of 4 years).    We discussed patient's PSA may rise with testosterone recovery. We will check PSA and testosterone levels every 3 months. If the rate of PSA doubling time is concerning, patient will receive PSMA PET scan with further treatment options to be discussed at that time.    # NMCSPC  - Discontinue ADT  - repeat PSA and testosterone in 3 months    # EDNA + on Guardant 360  - will see genetics with Dr. Irby 8/27/24    # Bone Health  - Continue calcium and vitamin D supplementation  - Continue regular, weight-bearing physical activity  - Last DEXA 2/2024, Consider DEXA 2/2026 if on therapy    # Health Maintenance  - Continue with PCP and other healthcare providers  - Continue exercise, heart-healthy diet    RTC 3 months with labs, virtual visit    Total time spent on this encounter was 30 minutes, which included preparation, direct time with patient, documentation, and care coordination on the day of visit.    Armen Heaton MD  Medical Oncology Fellow  8/8/2024      TEACHING ATTENDING ATTESTATION    I saw and evaluated this patient with Resident/Fellow. I reviewed the resident/fellow's documentation and discussed the patient with the resident/fellow. I agree with the resident/fellow's medical decision making as documented in the  note.   BCR after local definitive therapy  Wishes to go IAB  Will get off and continue with PSA and T every 3 months    Stephen Newman MD, FACP  Chief, Solid Tumor Oncology Division   Medical Oncology  Professor of Medicine and Urology  /[facility]

## 2024-08-26 DIAGNOSIS — E11.65 TYPE 2 DIABETES MELLITUS WITH HYPERGLYCEMIA, WITH LONG-TERM CURRENT USE OF INSULIN (MULTI): ICD-10-CM

## 2024-08-26 DIAGNOSIS — Z79.4 TYPE 2 DIABETES MELLITUS WITH HYPERGLYCEMIA, WITH LONG-TERM CURRENT USE OF INSULIN (MULTI): ICD-10-CM

## 2024-08-26 RX ORDER — METFORMIN HYDROCHLORIDE 750 MG/1
750 TABLET, EXTENDED RELEASE ORAL
Qty: 180 TABLET | Refills: 3 | Status: SHIPPED | OUTPATIENT
Start: 2024-08-26 | End: 2025-08-26

## 2024-08-27 ENCOUNTER — APPOINTMENT (OUTPATIENT)
Dept: GENETICS | Facility: CLINIC | Age: 74
End: 2024-08-27
Payer: MEDICARE

## 2024-09-04 ENCOUNTER — APPOINTMENT (OUTPATIENT)
Dept: PHARMACY | Facility: HOSPITAL | Age: 74
End: 2024-09-04
Payer: MEDICARE

## 2024-09-04 DIAGNOSIS — Z79.4 TYPE 2 DIABETES MELLITUS WITH DIABETIC NEUROPATHY, WITH LONG-TERM CURRENT USE OF INSULIN (MULTI): ICD-10-CM

## 2024-09-04 DIAGNOSIS — E11.40 TYPE 2 DIABETES MELLITUS WITH DIABETIC NEUROPATHY, WITH LONG-TERM CURRENT USE OF INSULIN (MULTI): ICD-10-CM

## 2024-09-04 DIAGNOSIS — Z79.4 TYPE 2 DIABETES MELLITUS WITH HYPERGLYCEMIA, WITH LONG-TERM CURRENT USE OF INSULIN (MULTI): Primary | ICD-10-CM

## 2024-09-04 DIAGNOSIS — E11.65 TYPE 2 DIABETES MELLITUS WITH HYPERGLYCEMIA, WITH LONG-TERM CURRENT USE OF INSULIN (MULTI): Primary | ICD-10-CM

## 2024-09-04 PROCEDURE — RXMED WILLOW AMBULATORY MEDICATION CHARGE

## 2024-09-04 RX ORDER — TIRZEPATIDE 7.5 MG/.5ML
7.5 INJECTION, SOLUTION SUBCUTANEOUS WEEKLY
Qty: 6 ML | Refills: 3 | Status: SHIPPED | OUTPATIENT
Start: 2024-09-04

## 2024-09-04 RX ORDER — INSULIN GLARGINE 100 [IU]/ML
30 INJECTION, SOLUTION SUBCUTANEOUS NIGHTLY
Qty: 30 ML | Refills: 3 | Status: SHIPPED | OUTPATIENT
Start: 2024-09-04 | End: 2025-09-04

## 2024-09-04 RX ORDER — METFORMIN HYDROCHLORIDE 750 MG/1
750 TABLET, EXTENDED RELEASE ORAL
Qty: 180 TABLET | Refills: 3 | Status: SHIPPED | OUTPATIENT
Start: 2024-09-04 | End: 2025-09-04

## 2024-09-04 NOTE — PROGRESS NOTES
Clinical Pharmacy Team contacted Miguelito Edge  regarding a consultation for diabetes management thanks to a referral from a referral from Purnima Bermudez CNP. Below is a summary of our conversation and recommendations:    Increase mounjaro to 7.5 mg subcutaneous once weekly  Stop pioglitazone  Continue all other DM medications as prescribed    ________________________________________________________________________      Allergies   Allergen Reactions    Nsaids (Non-Steroidal Anti-Inflammatory Drug) Unknown       Objective     There were no vitals taken for this visit.    Diabetes Pharmacotherapy:    metformin xr 750 twice daily  Pioglitazone 15 mg once daily  Jardiance 25 mg once daily  Mounjaro 5 mg subcutaneous once weekly  Basaglar 30 units once daily      Lab Review  Lab Results   Component Value Date    BILITOT 0.6 08/08/2024    CALCIUM 9.7 08/08/2024    CO2 30 08/08/2024     08/08/2024    CREATININE 1.17 08/08/2024    GLUCOSE 138 (H) 08/08/2024    ALKPHOS 60 08/08/2024    K 4.6 08/08/2024    PROT 7.4 08/08/2024     08/08/2024    AST 15 08/08/2024    ALT 11 08/08/2024    BUN 16 08/08/2024    ANIONGAP 13 08/08/2024     08/26/2021    ALBUMIN 4.3 08/08/2024    GFRMALE 68 08/31/2023     Lab Results   Component Value Date    TRIG 174 (H) 08/08/2024    CHOL 161 08/08/2024    LDLCALC 79 08/08/2024    HDL 47.2 08/08/2024     Lab Results   Component Value Date    HGBA1C 6.4 (H) 08/08/2024    HGBA1C 9.4 (A) 02/21/2024    HGBA1C 9.1 (A) 11/08/2023     The 10-year ASCVD risk score (Sd ZARATE, et al., 2019) is: 39.4%    Values used to calculate the score:      Age: 74 years      Sex: Male      Is Non- : Yes      Diabetic: Yes      Tobacco smoker: No      Systolic Blood Pressure: 142 mmHg      Is BP treated: Yes      HDL Cholesterol: 47.2 mg/dL      Total Cholesterol: 161 mg/dL    Patient is not currently wearing a CGM      Assessment/Plan     The patient reports today for a  diabetes consultation. Discussed DM regimen with the patient. A1C is now at goal. Patient reports tolerating mounjaro well. Has been on it for about 1 month and lost around 6-8 lbs now. We discussed stopping pioglitazone as glycemic control has improved. Furthermore discussed increase mounjaro to 7.5 mg weekly to help with weight loss which the patient is interested in. Patient was agreeable to these recommendations.       Lost around 8-10 lbs  PATIENT EDUCATION/GOALS      Goals  Fasting B - 130 mg/dL  Postprandial BG: less than 180 mg/dL  A1c: less than 7%    Provided counseling on lifestyle modifications, medications, and self-monitoring. Patient has no additional questions at this time. Pharmacy to follow up in 4-6 weeks. Please reach out with any questions. Thank you.       Chyna Cueto, PharmD    Continue all meds under the continuation of care with the referring provider and clinical pharmacy team.

## 2024-09-06 ENCOUNTER — PHARMACY VISIT (OUTPATIENT)
Dept: PHARMACY | Facility: CLINIC | Age: 74
End: 2024-09-06
Payer: MEDICARE

## 2024-09-07 ENCOUNTER — PHARMACY VISIT (OUTPATIENT)
Dept: PHARMACY | Facility: CLINIC | Age: 74
End: 2024-09-07

## 2024-10-03 ENCOUNTER — APPOINTMENT (OUTPATIENT)
Dept: PHARMACY | Facility: HOSPITAL | Age: 74
End: 2024-10-03
Payer: MEDICARE

## 2024-10-03 DIAGNOSIS — Z79.4 TYPE 2 DIABETES MELLITUS WITH HYPERGLYCEMIA, WITH LONG-TERM CURRENT USE OF INSULIN: ICD-10-CM

## 2024-10-03 DIAGNOSIS — E11.65 TYPE 2 DIABETES MELLITUS WITH HYPERGLYCEMIA, WITH LONG-TERM CURRENT USE OF INSULIN: ICD-10-CM

## 2024-10-03 NOTE — ASSESSMENT & PLAN NOTE
Patient's goal A1c is < 7%.  Is pt at goal? Yes, 6.4 on 8/8/24  Patient's SMBGs are 105-138.     Rationale for plan: Given the patient's A1c and SMBG's will continue his current medications and follow up with the clinical endocrinology pharmacist in 8 weeks.    Medication Changes: None    CONTINUE:  Metformin  mg BID  Mounjaro 7.5 mg weekly  Insulin Glargine 30 units at bedtime  Jardiance 25 mg daily    Future Considerations:  Consider weaning off of glargine, as patient continues to lose weight and maintain blood glucose goals  Consider discontinuing metformin for the same reasons above    Monitoring and Education:   Hypoglycemia, hyperglycemia, GI ADRs

## 2024-10-03 NOTE — PROGRESS NOTES
Clinical Pharmacy Appointment    Patient ID: Miguelito Edge is a 74 y.o. male who presents for Diabetes.    Pt is here for Follow Up appointment.     Referring Provider: Purnima Bermudez APR*  PCP: DAISHA Mills-CNP   Last visit with PCP: 8/5/24   Next visit with PCP: not scheduled      Subjective     Interval History  Telehealth visit with patient, his diabetes  is well controlled on Mounjaro 7.5 mg weekly  Patient endorses his weight loss has slowed down, he attributes this to weightlifting and gaining muscle mass  Patient endorses that he is watching his diet and cutting down on carbohydrates, his wife helps cook low carbohydrate meals and she is actively involved with his health     HPI  DIABETES MELLITUS TYPE II:    Diagnosed with diabetes:  >4 years ago. Known diabetic complications: HTN, diabetic neuropathy, dyslipidemia, and erectile dysfunction.  Does patient follow with Endocrinology: Yes  Last optometry exam: 10/1/24  Most recent visit in Podiatry: from progress note, some time in 2020     Current diabetic medications include:  Mounjaro 7.5 mg inject subcutaneous weekly  Metformin  mg take 1 tab PO BID  Insulin Glargine 30 units subcutaneous at bedtime daily  Jardiance 25 mg take 1 tablet daily in the morning    Adverse Effects: none    Past diabetic medications include:  Pioglitazone    Glucose Readings:  Glucometer/CGM Type: Glucometer  Patient tests BG 2 times per day    Current home BG readings: 105-138     Any episodes of hypoglycemia? No,   .  Did patient treat episode of hypoglycemia appropriately? N/A  Does the patient have a prescription for ready-to-use Glucagon? No    Does pt have proteinuria? Not at this time    Lifestyle:  Diet: low carbohydrate diet, pt is actively trying to lose weight and manage his diabetes with diet with hopes of decreasing some of the medications he is taking  Physical Activity: weight lifting, walking     Secondary Prevention:  Statin? Yes,  Rosuvastatin 10 mg  ACE-I/ARB? Yes, Losartan/HCTZ 100-25 mg  Aspirin? No    Pertinent PMH Review:  PMH of Pancreatitis: No  PMH of Retinopathy: No  PMH of Urinary Tract Infections: No  PMH of MTC: No UACr ratio calculated (from 8/8/24 Cr 69.8 and Albumin <7) ~98.9 is mildly increased     Medication Reconciliation:  Changed: none    Drug Interactions  No relevant drug interactions were noted.    Medication System Management  Patients preferred pharmacy:   [id number] [place] [address] [address] [facility], [place] [address]  [facility]    Objective   Allergies   Allergen Reactions    Nsaids (Non-Steroidal Anti-Inflammatory Drug) Unknown     Social History     Social History Narrative    Not on file      Medication Review  Current Outpatient Medications   Medication Instructions    atenolol (TENORMIN) 50 mg, oral, Daily    Basaglar KwikPen U-100 Insulin 30 Units, subcutaneous, Nightly, Take as directed per insulin instructions.    blood sugar diagnostic (Accu-Chek Guide test strips) strip 1 strip, miscellaneous, 2 times daily    docusate sodium (COLACE) 100 mg, oral, Daily PRN    econazole nitrate 1 % cream 2 times daily    ergocalciferol (VITAMIN D-2) 1,250 mcg, oral, Once Weekly    FreeStyle Stacie 2 [place] misc AS DIRECTED    FreeStyle Stacie 2 Sensor kit AS DIRECTED    gabapentin (Neurontin) 400 mg capsule 1 capsule, oral, 3 times daily    hydroCHLOROthiazide (MICROZIDE) 12.5 mg, oral, Daily before breakfast    Jardiance 25 mg, oral, Daily before breakfast    levothyroxine (Synthroid, Levoxyl) 25 mcg tablet Take 25 mcg by mouth once daily    losartan-hydrochlorothiazide (Hyzaar) 100-25 mg tablet 1 tablet, oral, Daily    metFORMIN XR (GLUCOPHAGE-XR) 750 mg, oral, 2 times daily before meals    Mounjaro 5 mg, subcutaneous, Once Weekly    Mounjaro 7.5 mg, subcutaneous, Weekly    omeprazole (PriLOSEC) 40 mg DR capsule 1 capsule, oral, Daily    pen needle, diabetic (BD  "Ultra-Fine Micro Pen Needle) 32 gauge x 1/4\" needle Use as directed to give insulin once a day    rosuvastatin (CRESTOR) 10 mg, oral, Nightly    sildenafil (Viagra) 100 mg tablet oral    traZODone (Desyrel) 150 mg tablet 1 tablet, oral, Nightly      Vitals  BP Readings from Last 2 Encounters:   08/08/24 142/71   05/13/24 145/83     BMI Readings from Last 1 Encounters:   08/08/24 39.45 kg/m²      Labs  A1C  Lab Results   Component Value Date    HGBA1C 6.4 (H) 08/08/2024    HGBA1C 9.4 (A) 02/21/2024    HGBA1C 9.1 (A) 11/08/2023     BMP  Lab Results   Component Value Date    CALCIUM 9.7 08/08/2024     08/08/2024    K 4.6 08/08/2024    CO2 30 08/08/2024     08/08/2024    BUN 16 08/08/2024    CREATININE 1.17 08/08/2024    EGFR 66 08/08/2024     LFTs  Lab Results   Component Value Date    ALT 11 08/08/2024    AST 15 08/08/2024    ALKPHOS 60 08/08/2024    BILITOT 0.6 08/08/2024     FLP  Lab Results   Component Value Date    TRIG 174 (H) 08/08/2024    CHOL 161 08/08/2024    LDLCALC 79 08/08/2024    HDL 47.2 08/08/2024     Urine Microalbumin  Lab Results   Component Value Date    MICROALBCREA  08/08/2024      Comment:      One or more analytes used in this calculation is outside of the analytical measurement range. Calculation cannot be performed.     Weight Management  Wt Readings from Last 3 Encounters:   08/08/24 109 kg (240 lb 12.8 oz)   05/13/24 107 kg (236 lb 8.9 oz)   03/15/24 104 kg (230 lb)      There is no height or weight on file to calculate BMI.     Assessment/Plan   Problem List Items Addressed This Visit       Type 2 diabetes mellitus with hyperglycemia, with long-term current use of insulin     Patient's goal A1c is < 7%.  Is pt at goal? Yes, 6.4 on 8/8/24  Patient's SMBGs are 105-138.     Rationale for plan: Given the patient's A1c and SMBG's will continue his current medications and follow up with the clinical endocrinology pharmacist in 8 weeks.    Medication Changes: " None    CONTINUE:  Metformin  mg BID  Mounjaro 7.5 mg weekly  Insulin Glargine 30 units at bedtime  Jardiance 25 mg daily    Future Considerations:  Consider weaning off of glargine, as patient continues to lose weight and maintain blood glucose goals  Consider discontinuing metformin for the same reasons above    Monitoring and Education:   Hypoglycemia, hyperglycemia, GI ADRs          Relevant Orders    Follow Up In [facility] - Pharmacy     Clinical Pharmacist follow-up: 12/5/24, 11:30 AM Telehealth visit    Continue all meds under the continuation of care with the referring provider and clinical pharmacy team.    Thank you,  Irina Osborne  Clinical Pharmacist  [phone]    Verbal consent to manage patient's drug therapy was obtained from the patient. They were informed they may decline to participate or withdraw from participation in pharmacy services at any time.    Ambulatory

## 2024-11-07 ENCOUNTER — TELEMEDICINE (OUTPATIENT)
Dept: HEMATOLOGY/ONCOLOGY | Facility: HOSPITAL | Age: 74
End: 2024-11-07
Payer: MEDICARE

## 2024-11-07 DIAGNOSIS — C61 MALIGNANT NEOPLASM OF PROSTATE (MULTI): ICD-10-CM

## 2024-11-07 PROCEDURE — 99212 OFFICE O/P EST SF 10 MIN: CPT | Performed by: INTERNAL MEDICINE

## 2024-11-07 PROCEDURE — 3044F HG A1C LEVEL LT 7.0%: CPT | Performed by: INTERNAL MEDICINE

## 2024-11-07 PROCEDURE — 3062F POS MACROALBUMINURIA REV: CPT | Performed by: INTERNAL MEDICINE

## 2024-11-07 PROCEDURE — 3048F LDL-C <100 MG/DL: CPT | Performed by: INTERNAL MEDICINE

## 2024-11-07 NOTE — PROGRESS NOTES
Medical Oncology    Miguelito Edge  [id number]  11/7/2024    73 yo AA male known to me with BCR after local definitive therapy  Off therapy per choice  PSA fluctuating but pt remaining asymptomatic  PCa history:  2014: Initially diagnosed with prostate cancer, high risk, iPSA 636/cT4/N+/GS7, HV disease without evidence of distant mets. Started ADT.  Summer 2014: Underwent definitive XRT with 81Gy  2017: Went off ADT  8/27/2020: PSA 1.86  9/11/2020: Restarted ADT for rising PSA to 2.67  Last received leuprolide 22.5 mg on 5/13/24    Lab Results   Component Value Date    PSA 0.33 08/08/2024    PSA 0.35 05/13/2024    PSA 0.39 02/18/2024     Lab Results   Component Value Date    WBC 6.6 08/08/2024    HGB 13.6 08/08/2024    HCT 42.7 08/08/2024    MCV 90 08/08/2024     08/08/2024     Lab Results   Component Value Date    GLUCOSE 138 (H) 08/08/2024    CALCIUM 9.7 08/08/2024     08/08/2024    K 4.6 08/08/2024    CO2 30 08/08/2024     08/08/2024    BUN 16 08/08/2024    CREATININE 1.17 08/08/2024     Lab Results   Component Value Date    TESTOSTERONE <30 (L) 08/08/2024     Lab Results   Component Value Date    ALT 11 08/08/2024    AST 15 08/08/2024    ALKPHOS 60 08/08/2024    BILITOT 0.6 08/08/2024       Called pt to discuss overall QOL and to follow on labs and our plan  Left two messages- his cell phone and his wife

## 2024-11-22 PROCEDURE — RXMED WILLOW AMBULATORY MEDICATION CHARGE

## 2024-11-23 ENCOUNTER — PHARMACY VISIT (OUTPATIENT)
Dept: PHARMACY | Facility: CLINIC | Age: 74
End: 2024-11-23
Payer: MEDICARE

## 2024-11-25 ENCOUNTER — TELEMEDICINE (OUTPATIENT)
Dept: HEMATOLOGY/ONCOLOGY | Facility: HOSPITAL | Age: 74
End: 2024-11-25
Payer: MEDICARE

## 2024-11-25 DIAGNOSIS — R79.89 LOW SERUM TESTOSTERONE LEVEL IN MALE: ICD-10-CM

## 2024-11-25 DIAGNOSIS — C61 PROSTATE CANCER (MULTI): Primary | ICD-10-CM

## 2024-11-25 PROCEDURE — 99213 OFFICE O/P EST LOW 20 MIN: CPT | Performed by: INTERNAL MEDICINE

## 2024-11-25 PROCEDURE — 3062F POS MACROALBUMINURIA REV: CPT | Performed by: INTERNAL MEDICINE

## 2024-11-25 PROCEDURE — 3048F LDL-C <100 MG/DL: CPT | Performed by: INTERNAL MEDICINE

## 2024-11-25 PROCEDURE — 3044F HG A1C LEVEL LT 7.0%: CPT | Performed by: INTERNAL MEDICINE

## 2024-11-25 RX ORDER — EPINEPHRINE 0.3 MG/.3ML
0.3 INJECTION SUBCUTANEOUS EVERY 5 MIN PRN
OUTPATIENT
Start: 2024-11-25

## 2024-11-25 RX ORDER — ALBUTEROL SULFATE 0.83 MG/ML
3 SOLUTION RESPIRATORY (INHALATION) AS NEEDED
OUTPATIENT
Start: 2024-11-25

## 2024-11-25 RX ORDER — FAMOTIDINE 10 MG/ML
20 INJECTION INTRAVENOUS ONCE AS NEEDED
OUTPATIENT
Start: 2024-11-25

## 2024-11-25 RX ORDER — DIPHENHYDRAMINE HYDROCHLORIDE 50 MG/ML
50 INJECTION INTRAMUSCULAR; INTRAVENOUS AS NEEDED
OUTPATIENT
Start: 2024-11-25

## 2024-11-25 NOTE — PROGRESS NOTES
Medical Oncology      Miguelito Edge  [id number]  11/25/2024      BCR after local definitive therapy  Off ADT as per request  Will get labs next week and see T and PSA  He remains asymptomatic    Genetics: Guardant 360  EDNA susceptibility gene  MSI-H not detected  Lab Results   Component Value Date    PSA 0.33 08/08/2024    PSA 0.35 05/13/2024    PSA 0.39 02/18/2024     Lab Results   Component Value Date    WBC 6.6 08/08/2024    HGB 13.6 08/08/2024    HCT 42.7 08/08/2024    MCV 90 08/08/2024     08/08/2024     Lab Results   Component Value Date    GLUCOSE 138 (H) 08/08/2024    CALCIUM 9.7 08/08/2024     08/08/2024    K 4.6 08/08/2024    CO2 30 08/08/2024     08/08/2024    BUN 16 08/08/2024    CREATININE 1.17 08/08/2024     Lab Results   Component Value Date    TESTOSTERONE <30 (L) 08/08/2024     Lab Results   Component Value Date    ALT 11 08/08/2024    AST 15 08/08/2024    ALKPHOS 60 08/08/2024    BILITOT 0.6 08/08/2024     Will continue off therapy  Labs next week  Will discuss results after    Stephen Newman MD, FACP  Chief, Solid Tumor Oncology Division   Medical Oncology  Professor of Medicine and Urology  /[facility]

## 2024-11-29 PROCEDURE — RXMED WILLOW AMBULATORY MEDICATION CHARGE

## 2024-12-03 ENCOUNTER — PHARMACY VISIT (OUTPATIENT)
Dept: PHARMACY | Facility: CLINIC | Age: 74
End: 2024-12-03
Payer: MEDICARE

## 2024-12-03 ENCOUNTER — TELEPHONE (OUTPATIENT)
Dept: ENDOCRINOLOGY | Facility: CLINIC | Age: 74
End: 2024-12-03
Payer: MEDICARE

## 2024-12-03 ENCOUNTER — LAB (OUTPATIENT)
Dept: LAB | Facility: HOSPITAL | Age: 74
End: 2024-12-03
Payer: MEDICARE

## 2024-12-03 DIAGNOSIS — R79.89 LOW SERUM TESTOSTERONE LEVEL IN MALE: ICD-10-CM

## 2024-12-03 DIAGNOSIS — Z79.4 TYPE 2 DIABETES MELLITUS WITH HYPERGLYCEMIA, WITH LONG-TERM CURRENT USE OF INSULIN: ICD-10-CM

## 2024-12-03 DIAGNOSIS — E11.65 TYPE 2 DIABETES MELLITUS WITH HYPERGLYCEMIA, WITH LONG-TERM CURRENT USE OF INSULIN: ICD-10-CM

## 2024-12-03 DIAGNOSIS — C61 MALIGNANT NEOPLASM OF PROSTATE (MULTI): ICD-10-CM

## 2024-12-03 DIAGNOSIS — C61 PROSTATE CANCER (MULTI): ICD-10-CM

## 2024-12-03 LAB
ALBUMIN SERPL BCP-MCNC: 4.3 G/DL (ref 3.4–5)
ALP SERPL-CCNC: 71 U/L (ref 33–136)
ALT SERPL W P-5'-P-CCNC: 10 U/L (ref 10–52)
ANION GAP SERPL CALC-SCNC: 12 MMOL/L (ref 10–20)
AST SERPL W P-5'-P-CCNC: 13 U/L (ref 9–39)
BILIRUB SERPL-MCNC: 0.5 MG/DL (ref 0–1.2)
BUN SERPL-MCNC: 17 MG/DL (ref 6–23)
CALCIUM SERPL-MCNC: 9.9 MG/DL (ref 8.6–10.3)
CHLORIDE SERPL-SCNC: 103 MMOL/L (ref 98–107)
CO2 SERPL-SCNC: 28 MMOL/L (ref 21–32)
CREAT SERPL-MCNC: 0.9 MG/DL (ref 0.5–1.3)
EGFRCR SERPLBLD CKD-EPI 2021: >90 ML/MIN/1.73M*2
GLUCOSE SERPL-MCNC: 152 MG/DL (ref 74–99)
HOLD SPECIMEN: NORMAL
POTASSIUM SERPL-SCNC: 4.2 MMOL/L (ref 3.5–5.3)
PROT SERPL-MCNC: 7.7 G/DL (ref 6.4–8.2)
PSA SERPL-MCNC: 0.34 NG/ML
SODIUM SERPL-SCNC: 139 MMOL/L (ref 136–145)
TESTOST SERPL-MCNC: <30 NG/DL (ref 240–1000)

## 2024-12-03 PROCEDURE — 80053 COMPREHEN METABOLIC PANEL: CPT

## 2024-12-03 PROCEDURE — 36415 COLL VENOUS BLD VENIPUNCTURE: CPT

## 2024-12-03 PROCEDURE — 84403 ASSAY OF TOTAL TESTOSTERONE: CPT

## 2024-12-03 PROCEDURE — 84153 ASSAY OF PSA TOTAL: CPT

## 2024-12-03 RX ORDER — METFORMIN HYDROCHLORIDE 750 MG/1
750 TABLET, EXTENDED RELEASE ORAL
Qty: 180 TABLET | Refills: 1 | Status: SHIPPED | OUTPATIENT
Start: 2024-12-03 | End: 2024-12-06 | Stop reason: SDUPTHER

## 2024-12-05 ENCOUNTER — APPOINTMENT (OUTPATIENT)
Dept: PHARMACY | Facility: HOSPITAL | Age: 74
End: 2024-12-05
Payer: MEDICARE

## 2024-12-05 DIAGNOSIS — E11.65 TYPE 2 DIABETES MELLITUS WITH HYPERGLYCEMIA, WITH LONG-TERM CURRENT USE OF INSULIN: ICD-10-CM

## 2024-12-05 DIAGNOSIS — E11.40 TYPE 2 DIABETES MELLITUS WITH DIABETIC NEUROPATHY, WITH LONG-TERM CURRENT USE OF INSULIN: ICD-10-CM

## 2024-12-05 DIAGNOSIS — Z79.4 TYPE 2 DIABETES MELLITUS WITH HYPERGLYCEMIA, WITH LONG-TERM CURRENT USE OF INSULIN: ICD-10-CM

## 2024-12-05 DIAGNOSIS — Z79.4 TYPE 2 DIABETES MELLITUS WITH DIABETIC NEUROPATHY, WITH LONG-TERM CURRENT USE OF INSULIN: ICD-10-CM

## 2024-12-06 RX ORDER — TIRZEPATIDE 7.5 MG/.5ML
7.5 INJECTION, SOLUTION SUBCUTANEOUS WEEKLY
Qty: 6 ML | Refills: 3 | Status: SHIPPED | OUTPATIENT
Start: 2024-12-06

## 2024-12-06 RX ORDER — INSULIN GLARGINE 100 [IU]/ML
30 INJECTION, SOLUTION SUBCUTANEOUS NIGHTLY
Qty: 30 ML | Refills: 3 | Status: SHIPPED | OUTPATIENT
Start: 2024-12-06 | End: 2025-12-06

## 2024-12-06 RX ORDER — METFORMIN HYDROCHLORIDE 750 MG/1
750 TABLET, EXTENDED RELEASE ORAL
Qty: 180 TABLET | Refills: 3 | Status: SHIPPED | OUTPATIENT
Start: 2024-12-06 | End: 2025-12-06

## 2024-12-06 NOTE — PROGRESS NOTES
Clinical Pharmacy Team contacted Miguelito Edge  regarding a consultation for diabetes management thanks to a referral from a referral from Purnima Bermudez CNP. Below is a summary of our conversation and recommendations:    Continue all other DM medications as prescribed    ________________________________________________________________________      Allergies   Allergen Reactions    Nsaids (Non-Steroidal Anti-Inflammatory Drug) Unknown       Objective     There were no vitals taken for this visit.    Diabetes Pharmacotherapy:    metformin xr 750 twice daily  Jardiance 25 mg once daily  Mounjaro 7.5 mg subcutaneous once weekly  Basaglar 30 units once daily      Lab Review  Lab Results   Component Value Date    BILITOT 0.5 12/03/2024    CALCIUM 9.9 12/03/2024    CO2 28 12/03/2024     12/03/2024    CREATININE 0.90 12/03/2024    GLUCOSE 152 (H) 12/03/2024    ALKPHOS 71 12/03/2024    K 4.2 12/03/2024    PROT 7.7 12/03/2024     12/03/2024    AST 13 12/03/2024    ALT 10 12/03/2024    BUN 17 12/03/2024    ANIONGAP 12 12/03/2024     08/26/2021    ALBUMIN 4.3 12/03/2024    GFRMALE 68 08/31/2023     Lab Results   Component Value Date    TRIG 174 (H) 08/08/2024    CHOL 161 08/08/2024    LDLCALC 79 08/08/2024    HDL 47.2 08/08/2024     Lab Results   Component Value Date    HGBA1C 6.4 (H) 08/08/2024    HGBA1C 9.4 (A) 02/21/2024    HGBA1C 9.1 (A) 11/08/2023     The 10-year ASCVD risk score (Sd ZARATE, et al., 2019) is: 39.4%    Values used to calculate the score:      Age: 74 years      Sex: Male      Is Non- : Yes      Diabetic: Yes      Tobacco smoker: No      Systolic Blood Pressure: 142 mmHg      Is BP treated: Yes      HDL Cholesterol: 47.2 mg/dL      Total Cholesterol: 161 mg/dL        Assessment/Plan     The patient reports today for a diabetes consultation. Discussed DM regimen with the patient. A1C is at goal. TIR at goal. Patient reports tolerating mounjaro well. Patient has  stopped pioglitazone as advised from last appointment and is still meeting glycemic goals. At this time recommend continuing current regimen. Patient was agreeable to these recommendations.       Lost around 8-10 lbs  PATIENT EDUCATION/GOALS      Goals  Fasting B - 130 mg/dL  Postprandial BG: less than 180 mg/dL  A1c: less than 7%    Provided counseling on lifestyle modifications, medications, and self-monitoring. Patient has no additional questions at this time. Pharmacy to follow up as requested. Please reach out with any questions. Thank you.       Chyna Cueto, PharmD    Continue all meds under the continuation of care with the referring provider and clinical pharmacy team.

## 2024-12-31 ENCOUNTER — TELEPHONE (OUTPATIENT)
Dept: HEMATOLOGY/ONCOLOGY | Facility: HOSPITAL | Age: 74
End: 2024-12-31
Payer: MEDICARE

## 2024-12-31 NOTE — TELEPHONE ENCOUNTER
RN returned patient's phone call, patient did not answer. Per Dr. Newman's last visit note (12/3/24), patient is currently off of ADT. Leuprolide is also not currently authorized. RN informed patient she was calling in response to his original call and asked patient to please call the office back at [phone].   Josey Titus RN 12/31/24 11:36 AM

## 2025-01-02 NOTE — TELEPHONE ENCOUNTER
RN called patient to follow up on phone call from 12/31/24. Patient states he is confused on why he suddenly needs his Eligard injection because he has been off of the injection for several months. RN informed patient that Dr. Newman's notes reflect that he has been off of treatment. Patient would like to continue to stay off of treatment at this time. RN canceled his injection appointment. Patient aware he has a follow up virtual appointment with Dr. Newman on 2/6/25. No further questions or concerns at this time.   Josey Titus RN 01/02/25 10:50 AM

## 2025-01-13 NOTE — TELEPHONE ENCOUNTER
Miguelito said he is returning his call to Josey Titus R.N from 1/2/25. He said he wanted to make sure his infusion appointment was canceled. I told him next visit in appointment list is with Dr. Newman 2/6/25. He said this is correct. I see scheduling order is still active and he is getting text messages to schedule the infusion. Messaged team.

## 2025-01-13 NOTE — TELEPHONE ENCOUNTER
I removed the scheduling request for the Eligard injection, patient is correct he off tx as noted in MD visit on 12/3/24.   Thanks, Renetta CAMACHO RN

## 2025-01-20 ENCOUNTER — APPOINTMENT (OUTPATIENT)
Dept: HEMATOLOGY/ONCOLOGY | Facility: HOSPITAL | Age: 75
End: 2025-01-20
Payer: MEDICARE

## 2025-02-06 ENCOUNTER — APPOINTMENT (OUTPATIENT)
Dept: HEMATOLOGY/ONCOLOGY | Facility: HOSPITAL | Age: 75
End: 2025-02-06
Payer: MEDICARE

## 2025-02-06 DIAGNOSIS — R79.89 LOW SERUM TESTOSTERONE LEVEL IN MALE: ICD-10-CM

## 2025-02-06 DIAGNOSIS — E11.49 DIABETIC NEUROPATHY WITH NEUROLOGIC COMPLICATION (MULTI): ICD-10-CM

## 2025-02-06 DIAGNOSIS — E11.40 DIABETIC NEUROPATHY WITH NEUROLOGIC COMPLICATION (MULTI): ICD-10-CM

## 2025-02-06 DIAGNOSIS — C61 PROSTATE CANCER (MULTI): ICD-10-CM

## 2025-02-06 PROCEDURE — 99213 OFFICE O/P EST LOW 20 MIN: CPT | Performed by: INTERNAL MEDICINE

## 2025-02-06 RX ORDER — GABAPENTIN 400 MG/1
400 CAPSULE ORAL 3 TIMES DAILY
Qty: 270 CAPSULE | Refills: 0 | Status: CANCELLED | OUTPATIENT
Start: 2025-02-06

## 2025-02-06 NOTE — PROGRESS NOTES
Medical Oncology Virtual/Call    Miguelito Edge  [id number]  2/6/2025    73 yo male known to us with BCR  Off ADT   Minimal changes in PSA with T suppressed  Feeling well with no new symptoms  NO new AEs    Lab Results   Component Value Date    PSA 0.34 12/03/2024    PSA 0.33 08/08/2024    PSA 0.35 05/13/2024     Lab Results   Component Value Date    WBC 6.6 08/08/2024    HGB 13.6 08/08/2024    HCT 42.7 08/08/2024    MCV 90 08/08/2024     08/08/2024     Lab Results   Component Value Date    GLUCOSE 152 (H) 12/03/2024    CALCIUM 9.9 12/03/2024     12/03/2024    K 4.2 12/03/2024    CO2 28 12/03/2024     12/03/2024    BUN 17 12/03/2024    CREATININE 0.90 12/03/2024     Lab Results   Component Value Date    TESTOSTERONE <30 (L) 12/03/2024     Lab Results   Component Value Date    ALT 10 12/03/2024    AST 13 12/03/2024    ALKPHOS 71 12/03/2024    BILITOT 0.5 12/03/2024     Doing great  Will get PSA and T level in 3 months    Stephen Newman MD, FACP  Chief, Solid Tumor Oncology Division   Medical Oncology  Professor of Medicine and Urology  /[facility]

## 2025-02-14 PROCEDURE — RXMED WILLOW AMBULATORY MEDICATION CHARGE

## 2025-02-17 ENCOUNTER — PHARMACY VISIT (OUTPATIENT)
Dept: PHARMACY | Facility: CLINIC | Age: 75
End: 2025-02-17
Payer: MEDICARE

## 2025-02-20 DIAGNOSIS — E55.9 VITAMIN D DEFICIENCY: ICD-10-CM

## 2025-02-27 PROCEDURE — RXMED WILLOW AMBULATORY MEDICATION CHARGE

## 2025-02-28 DIAGNOSIS — E55.9 VITAMIN D DEFICIENCY: ICD-10-CM

## 2025-02-28 RX ORDER — ERGOCALCIFEROL 1.25 MG/1
1 CAPSULE ORAL
Qty: 13 CAPSULE | Refills: 3 | Status: CANCELLED | OUTPATIENT
Start: 2025-03-02

## 2025-03-03 ENCOUNTER — PHARMACY VISIT (OUTPATIENT)
Dept: PHARMACY | Facility: CLINIC | Age: 75
End: 2025-03-03
Payer: MEDICARE

## 2025-03-04 ENCOUNTER — LAB (OUTPATIENT)
Dept: LAB | Facility: HOSPITAL | Age: 75
End: 2025-03-04
Payer: MEDICARE

## 2025-03-04 ENCOUNTER — APPOINTMENT (OUTPATIENT)
Dept: ENDOCRINOLOGY | Facility: CLINIC | Age: 75
End: 2025-03-04
Payer: MEDICARE

## 2025-03-04 VITALS
DIASTOLIC BLOOD PRESSURE: 87 MMHG | SYSTOLIC BLOOD PRESSURE: 137 MMHG | BODY MASS INDEX: 37.8 KG/M2 | WEIGHT: 235.2 LBS | HEART RATE: 75 BPM | TEMPERATURE: 97.7 F | HEIGHT: 66 IN

## 2025-03-04 DIAGNOSIS — E11.65 TYPE 2 DIABETES MELLITUS WITH HYPERGLYCEMIA, WITH LONG-TERM CURRENT USE OF INSULIN: Primary | ICD-10-CM

## 2025-03-04 DIAGNOSIS — E55.9 VITAMIN D DEFICIENCY: ICD-10-CM

## 2025-03-04 DIAGNOSIS — C61 PROSTATE CANCER (MULTI): ICD-10-CM

## 2025-03-04 DIAGNOSIS — E78.5 HYPERLIPIDEMIA, UNSPECIFIED HYPERLIPIDEMIA TYPE: ICD-10-CM

## 2025-03-04 DIAGNOSIS — Z79.4 TYPE 2 DIABETES MELLITUS WITH HYPERGLYCEMIA, WITH LONG-TERM CURRENT USE OF INSULIN: Primary | ICD-10-CM

## 2025-03-04 DIAGNOSIS — E03.9 HYPOTHYROIDISM, UNSPECIFIED TYPE: ICD-10-CM

## 2025-03-04 DIAGNOSIS — E11.40 PAINFUL DIABETIC NEUROPATHY (MULTI): ICD-10-CM

## 2025-03-04 LAB
ALBUMIN SERPL BCP-MCNC: 4.3 G/DL (ref 3.4–5)
ALP SERPL-CCNC: 74 U/L (ref 33–136)
ALT SERPL W P-5'-P-CCNC: 10 U/L (ref 10–52)
ANION GAP SERPL CALC-SCNC: 14 MMOL/L (ref 10–20)
AST SERPL W P-5'-P-CCNC: 15 U/L (ref 9–39)
BASOPHILS # BLD AUTO: 0.03 X10*3/UL (ref 0–0.1)
BASOPHILS NFR BLD AUTO: 0.5 %
BILIRUB SERPL-MCNC: 0.5 MG/DL (ref 0–1.2)
BUN SERPL-MCNC: 16 MG/DL (ref 6–23)
CALCIUM SERPL-MCNC: 9.7 MG/DL (ref 8.6–10.6)
CHLORIDE SERPL-SCNC: 102 MMOL/L (ref 98–107)
CO2 SERPL-SCNC: 27 MMOL/L (ref 21–32)
CREAT SERPL-MCNC: 1.05 MG/DL (ref 0.5–1.3)
EGFRCR SERPLBLD CKD-EPI 2021: 75 ML/MIN/1.73M*2
EOSINOPHIL # BLD AUTO: 0.19 X10*3/UL (ref 0–0.4)
EOSINOPHIL NFR BLD AUTO: 2.9 %
ERYTHROCYTE [DISTWIDTH] IN BLOOD BY AUTOMATED COUNT: 13.4 % (ref 11.5–14.5)
GLUCOSE SERPL-MCNC: 120 MG/DL (ref 74–99)
HCT VFR BLD AUTO: 44 % (ref 41–52)
HGB BLD-MCNC: 13 G/DL (ref 13.5–17.5)
IMM GRANULOCYTES # BLD AUTO: 0.01 X10*3/UL (ref 0–0.5)
IMM GRANULOCYTES NFR BLD AUTO: 0.2 % (ref 0–0.9)
LYMPHOCYTES # BLD AUTO: 2.66 X10*3/UL (ref 0.8–3)
LYMPHOCYTES NFR BLD AUTO: 40.8 %
MCH RBC QN AUTO: 27.2 PG (ref 26–34)
MCHC RBC AUTO-ENTMCNC: 29.5 G/DL (ref 32–36)
MCV RBC AUTO: 92 FL (ref 80–100)
MONOCYTES # BLD AUTO: 0.39 X10*3/UL (ref 0.05–0.8)
MONOCYTES NFR BLD AUTO: 6 %
NEUTROPHILS # BLD AUTO: 3.24 X10*3/UL (ref 1.6–5.5)
NEUTROPHILS NFR BLD AUTO: 49.6 %
NRBC BLD-RTO: 0 /100 WBCS (ref 0–0)
PLATELET # BLD AUTO: 238 X10*3/UL (ref 150–450)
POC HEMOGLOBIN A1C: 7.1 % (ref 4.2–6.5)
POTASSIUM SERPL-SCNC: 5.1 MMOL/L (ref 3.5–5.3)
PROT SERPL-MCNC: 7.3 G/DL (ref 6.4–8.2)
PSA SERPL-MCNC: 0.4 NG/ML
RBC # BLD AUTO: 4.78 X10*6/UL (ref 4.5–5.9)
SODIUM SERPL-SCNC: 138 MMOL/L (ref 136–145)
TESTOST SERPL-MCNC: <30 NG/DL (ref 240–1000)
WBC # BLD AUTO: 6.5 X10*3/UL (ref 4.4–11.3)

## 2025-03-04 PROCEDURE — 95251 CONT GLUC MNTR ANALYSIS I&R: CPT | Performed by: NURSE PRACTITIONER

## 2025-03-04 PROCEDURE — 85025 COMPLETE CBC W/AUTO DIFF WBC: CPT

## 2025-03-04 PROCEDURE — 3075F SYST BP GE 130 - 139MM HG: CPT | Performed by: NURSE PRACTITIONER

## 2025-03-04 PROCEDURE — 3079F DIAST BP 80-89 MM HG: CPT | Performed by: NURSE PRACTITIONER

## 2025-03-04 PROCEDURE — 99215 OFFICE O/P EST HI 40 MIN: CPT | Performed by: NURSE PRACTITIONER

## 2025-03-04 PROCEDURE — G2211 COMPLEX E/M VISIT ADD ON: HCPCS | Performed by: NURSE PRACTITIONER

## 2025-03-04 PROCEDURE — 84153 ASSAY OF PSA TOTAL: CPT

## 2025-03-04 PROCEDURE — 84403 ASSAY OF TOTAL TESTOSTERONE: CPT

## 2025-03-04 PROCEDURE — 3008F BODY MASS INDEX DOCD: CPT | Performed by: NURSE PRACTITIONER

## 2025-03-04 PROCEDURE — 1036F TOBACCO NON-USER: CPT | Performed by: NURSE PRACTITIONER

## 2025-03-04 PROCEDURE — 1159F MED LIST DOCD IN RCRD: CPT | Performed by: NURSE PRACTITIONER

## 2025-03-04 PROCEDURE — 80053 COMPREHEN METABOLIC PANEL: CPT

## 2025-03-04 PROCEDURE — 83036 HEMOGLOBIN GLYCOSYLATED A1C: CPT | Performed by: NURSE PRACTITIONER

## 2025-03-04 PROCEDURE — 1160F RVW MEDS BY RX/DR IN RCRD: CPT | Performed by: NURSE PRACTITIONER

## 2025-03-04 RX ORDER — BLOOD-GLUCOSE,RECEIVER,CONT
EACH MISCELLANEOUS
Qty: 1 EACH | Refills: 0 | Status: SHIPPED | OUTPATIENT
Start: 2025-03-04

## 2025-03-04 RX ORDER — GABAPENTIN 400 MG/1
400 CAPSULE ORAL NIGHTLY
Qty: 90 CAPSULE | Refills: 1 | Status: SHIPPED | OUTPATIENT
Start: 2025-03-04

## 2025-03-04 RX ORDER — BLOOD-GLUCOSE SENSOR
EACH MISCELLANEOUS
Qty: 6 EACH | Refills: 3 | Status: SHIPPED | OUTPATIENT
Start: 2025-03-04

## 2025-03-04 ASSESSMENT — ENCOUNTER SYMPTOMS
DEPRESSION: 0
OCCASIONAL FEELINGS OF UNSTEADINESS: 0
LOSS OF SENSATION IN FEET: 1

## 2025-03-04 ASSESSMENT — PATIENT HEALTH QUESTIONNAIRE - PHQ9
SUM OF ALL RESPONSES TO PHQ9 QUESTIONS 1 AND 2: 0
2. FEELING DOWN, DEPRESSED OR HOPELESS: NOT AT ALL
1. LITTLE INTEREST OR PLEASURE IN DOING THINGS: NOT AT ALL

## 2025-03-04 NOTE — PROGRESS NOTES
"Subjective   Miguelito Edge is a 75 y.o. male presents today for a follow up of DM Type 2. Initial diagnosis with diabetes was several years ago.   Known complications include: HTN, Hyperlipidemia, diabetic neuropathy (on gabapentin)    Last visit with me 6/2024 virtually  A1c today 7.1%.  Previous A1c 6.4% in 8/2024  Since last visit, he was able to talk with PharmD  He is now getting insulin, jardiance and mounjaro through  PAP  Having some connection issues with Stacie 2  Overall doing well   He asks about taking wegovy today     Historical meds;   Pioglitazone- stopped once jardiance and mounjaro started    Current diabetes regimen is as follows:   Metformin  mg twice daily   Jardiance 25 mg once daily -  PAP  Mounjaro 7.5 mg once weekly  PAP  Basaglar 25 units once daily in the morning        Patient is using continuous glucose monitor - Stacie 2   The patient is currently checking the blood glucose as needed          Hypoglycemia frequency: 0%  Hypoglycemia awareness: Yes    The patient comes into the office today in need of refills       ROS  General: no fever, chills or acute changes in weight in the last 6 months  Skin: no rashes, pruritis or dry skin  Cardiac: denies chest pain, heart palpitations or orthopnea  Pulmonary: denies wheezing, productive cough or exertional dyspnea      Objective    Physical Exam  Blood pressure 137/87, pulse 75, temperature 36.5 °C (97.7 °F), temperature source Oral, height 1.676 m (5' 6\"), weight 107 kg (235 lb 3.2 oz).  General: not in acute distress, cooperative   Respiratory: normal respiratory effort  Musculoskeletal: normal gait           Current Outpatient Medications:     atenolol (Tenormin) 50 mg tablet, Take 1 tablet (50 mg) by mouth once daily., Disp: , Rfl:     blood sugar diagnostic (Accu-Chek Guide test strips) strip, 1 strip 2 times a day., Disp: 200 strip, Rfl: 2    docusate sodium (Colace) 100 mg capsule, Take 1 capsule (100 mg) by mouth once daily " "as needed., Disp: , Rfl:     econazole nitrate 1 % cream, twice a day., Disp: , Rfl:     empagliflozin (Jardiance) 25 mg, Take 1 tablet (25 mg) by mouth once daily in the morning. Take before meals., Disp: 90 tablet, Rfl: 3    ergocalciferol (Vitamin D-2) 1.25 MG (56699 UT) capsule, Take 1 capsule (1,250 mcg) by mouth 1 (one) time per week., Disp: 13 capsule, Rfl: 3    FreeStyle Stacie 2 Satsuma misc, AS DIRECTED, Disp: , Rfl:     FreeStyle Stacie 2 Sensor kit, AS DIRECTED, Disp: , Rfl:     gabapentin (Neurontin) 400 mg capsule, Take 1 capsule (400 mg) by mouth 3 times a day., Disp: , Rfl:     hydroCHLOROthiazide (HYDRODiuril) 12.5 mg tablet, Take 1 tablet (12.5 mg) by mouth once daily in the morning. Take before meals., Disp: , Rfl:     insulin glargine (Basaglar KwikPen U-100 Insulin) 100 unit/mL (3 mL) pen, Inject 30 Units under the skin once daily at bedtime. Take as directed per insulin instructions., Disp: 30 mL, Rfl: 3    levothyroxine (Synthroid, Levoxyl) 25 mcg tablet, Take 25 mcg by mouth once daily, Disp: 90 tablet, Rfl: 3    losartan-hydrochlorothiazide (Hyzaar) 100-25 mg tablet, Take 1 tablet by mouth once daily., Disp: , Rfl:     metFORMIN XR (Glucophage-XR) 750 mg 24 hr tablet, Take 1 tablet (750 mg) by mouth 2 times a day before meals., Disp: 180 tablet, Rfl: 3    omeprazole (PriLOSEC) 40 mg DR capsule, Take 1 capsule (40 mg) by mouth once daily., Disp: , Rfl:     pen needle, diabetic (BD Ultra-Fine Micro Pen Needle) 32 gauge x 1/4\" needle, Use as directed to give insulin once a day, Disp: 100 each, Rfl: 3    rosuvastatin (Crestor) 10 mg tablet, Take 1 tablet (10 mg) by mouth once daily at bedtime., Disp: 90 tablet, Rfl: 3    sildenafil (Viagra) 100 mg tablet, Take by mouth., Disp: , Rfl:     tirzepatide (Mounjaro) 7.5 mg/0.5 mL pen injector, Inject 7.5 mg under the skin 1 (one) time per week., Disp: 6 mL, Rfl: 3    traZODone (Desyrel) 150 mg tablet, Take 1 tablet (150 mg) by mouth once daily at " bedtime., Disp: , Rfl:     Assessment/Plan   Type 2 diabetes with hyperglycemia with long term use of insulin:   Painful diabetic neuropathy:   -A1c not quite at goal.  He has met with Pharm.D. and now getting patient assistance from  for insulin, Jardiance, Mounjaro.  He asks about Wegovy today as he is interested in weight reduction and discussed how he is already taking Mounjaro.  Given this we will adjust the Mounjaro and recommend him follow-up with pharmacy for that dose adjustment.  He has a supply of 7.5 mg to use up anyway.  He may miss a scan or having connection issues with the stacie 2 and discussed today changing to the stacie 3+.  He will need to download the jany when he uses of his other supplies.  Short-term follow-up with Pharm.D. for additional adjustments.  He is in need of refill for insulin and discussed this is ready and just needs to call [facility].  Will refill his gabapentin.  He reports only needing about once per week.            Plan:  Meet with Chyna to change Mounjaro to 10 mg once weekly   In the meantime use up your Mounjaro 7.5 mg once weekly   For now, continue metformin and your jardiance as is   Continue Basaglar 25 units once daily    Change to Stacie 3+   Get labs and urine   Follow up in 6 months       Hypothyroidism:   -TSH previously in range.  Clinically and biochemically euthyroid.  Treated by PCP    Plan:   Continue levothyroxine 25 mcg once daily   Get updated labs    Vit D deficiency:   - Previously at goal with once weekly. Not currently taking supplementation at this time. Will get updated labs    Plan:   Get updated labs     Hyperlipidemia:   LDL previously at goal.  He is on statin     Plan:   Get updated labs   Continue statin as prescribed for now

## 2025-03-04 NOTE — PATIENT INSTRUCTIONS
Meet with Chyna to change Mounjaro to 10 mg once weekly   In the meantime use up your Mounjaro 7.5 mg once weekly     For now, continue metformin and your jardiance as is     Continue Basaglar 25 units once daily      Change to Stacie 3+     Get labs and urine     Follow up in 6 months     Call [facility] ( Pharmacy) and let them know you are ready for your Basaglar to be shipped.  [phone]

## 2025-03-04 NOTE — Clinical Note
Patient seen at [facility].  Number is [phone].  Can you please call and get his most recent dilated eye exam sent over?  Thanks.

## 2025-03-05 DIAGNOSIS — E78.5 HYPERLIPIDEMIA, UNSPECIFIED HYPERLIPIDEMIA TYPE: ICD-10-CM

## 2025-03-05 DIAGNOSIS — E55.9 VITAMIN D DEFICIENCY: ICD-10-CM

## 2025-03-05 LAB
25(OH)D3+25(OH)D2 SERPL-MCNC: 89 NG/ML (ref 30–100)
CHOLEST SERPL-MCNC: 135 MG/DL
CHOLEST/HDLC SERPL: 3.1 (CALC)
HDLC SERPL-MCNC: 43 MG/DL
LDLC SERPL CALC-MCNC: 74 MG/DL (CALC)
NONHDLC SERPL-MCNC: 92 MG/DL (CALC)
TRIGL SERPL-MCNC: 97 MG/DL
TSH SERPL-ACNC: 2.11 MIU/L (ref 0.4–4.5)

## 2025-03-05 RX ORDER — ROSUVASTATIN CALCIUM 10 MG/1
10 TABLET, COATED ORAL NIGHTLY
Qty: 90 TABLET | Refills: 3 | Status: SHIPPED | OUTPATIENT
Start: 2025-03-05

## 2025-03-05 RX ORDER — ERGOCALCIFEROL 1.25 MG/1
1 CAPSULE ORAL
Qty: 13 CAPSULE | Refills: 0 | OUTPATIENT
Start: 2025-03-05

## 2025-03-05 RX ORDER — ERGOCALCIFEROL 1.25 MG/1
1 CAPSULE ORAL
Qty: 3 CAPSULE | Refills: 3 | Status: SHIPPED | OUTPATIENT
Start: 2025-03-05

## 2025-03-11 ENCOUNTER — TELEPHONE (OUTPATIENT)
Dept: ENDOCRINOLOGY | Facility: CLINIC | Age: 75
End: 2025-03-11
Payer: MEDICARE

## 2025-03-11 NOTE — TELEPHONE ENCOUNTER
Spoke with patient this morning to inform him of his lab results and he stated that he is so ecstatic to hear from our office. He has an eye exam schedule for the 21st of this month. He will share the result once he has them. He also stated that Purnima is doing such a wonderful job helping him and keeping his diabetes under control.

## 2025-03-13 ENCOUNTER — APPOINTMENT (OUTPATIENT)
Dept: PHARMACY | Facility: HOSPITAL | Age: 75
End: 2025-03-13
Payer: MEDICARE

## 2025-03-13 DIAGNOSIS — E11.65 TYPE 2 DIABETES MELLITUS WITH HYPERGLYCEMIA, WITH LONG-TERM CURRENT USE OF INSULIN: ICD-10-CM

## 2025-03-13 DIAGNOSIS — Z79.4 TYPE 2 DIABETES MELLITUS WITH HYPERGLYCEMIA, WITH LONG-TERM CURRENT USE OF INSULIN: ICD-10-CM

## 2025-03-13 PROCEDURE — RXMED WILLOW AMBULATORY MEDICATION CHARGE

## 2025-03-13 RX ORDER — METFORMIN HYDROCHLORIDE 750 MG/1
750 TABLET, EXTENDED RELEASE ORAL
Qty: 180 TABLET | Refills: 3 | Status: SHIPPED | OUTPATIENT
Start: 2025-03-13 | End: 2026-03-13

## 2025-03-13 RX ORDER — INSULIN GLARGINE 100 [IU]/ML
25 INJECTION, SOLUTION SUBCUTANEOUS NIGHTLY
Qty: 30 ML | Refills: 3 | Status: SHIPPED | OUTPATIENT
Start: 2025-03-13 | End: 2026-03-13

## 2025-03-13 RX ORDER — TIRZEPATIDE 10 MG/.5ML
10 INJECTION, SOLUTION SUBCUTANEOUS WEEKLY
Qty: 6 ML | Refills: 3 | Status: SHIPPED | OUTPATIENT
Start: 2025-03-13

## 2025-03-13 NOTE — PROGRESS NOTES
Clinical Pharmacy Team contacted Miguelito Edge  regarding a consultation for diabetes management thanks to a referral from a referral from Purnima Bermudez CNP. Below is a summary of our conversation and recommendations:    Continue all other DM medications as prescribed    ________________________________________________________________________      Allergies   Allergen Reactions    Nsaids (Non-Steroidal Anti-Inflammatory Drug) Unknown       Objective     There were no vitals taken for this visit.    Diabetes Pharmacotherapy:    metformin xr 750 twice daily  Jardiance 25 mg once daily  Mounjaro 7.5 mg subcutaneous once weekly (will be starting 10 mg weekly soon)  Basaglar 30 units once daily      Lab Review  Lab Results   Component Value Date    BILITOT 0.5 03/04/2025    CALCIUM 9.7 03/04/2025    CO2 27 03/04/2025     03/04/2025    CREATININE 1.05 03/04/2025    GLUCOSE 120 (H) 03/04/2025    ALKPHOS 74 03/04/2025    K 5.1 03/04/2025    PROT 7.3 03/04/2025     03/04/2025    AST 15 03/04/2025    ALT 10 03/04/2025    BUN 16 03/04/2025    ANIONGAP 14 03/04/2025     08/26/2021    ALBUMIN 4.3 03/04/2025    GFRMALE 68 08/31/2023     Lab Results   Component Value Date    TRIG 97 03/04/2025    CHOL 135 03/04/2025    LDLCALC 74 03/04/2025    HDL 43 03/04/2025     Lab Results   Component Value Date    HGBA1C 7.1 (A) 03/04/2025    HGBA1C 6.4 (H) 08/08/2024    HGBA1C 9.4 (A) 02/21/2024     The 10-year ASCVD risk score (Sd ZARATE, et al., 2019) is: 37.6%    Values used to calculate the score:      Age: 75 years      Sex: Male      Is Non- : Yes      Diabetic: Yes      Tobacco smoker: No      Systolic Blood Pressure: 137 mmHg      Is BP treated: Yes      HDL Cholesterol: 43 mg/dL      Total Cholesterol: 135 mg/dL        Assessment/Plan     The patient reports today for a diabetes consultation. Discussed DM regimen with the patient. A1C is near goal. TIR at goal. Patient reports tolerating  mounjaro well. He will be increasing to the 10 mg dose once he finishes his supply of 7.5 mg pens. Discussed lowering insulin if hypoglycemia occurs (2 units per episode) At this time recommend continuing current regimen. Patient was agreeable to these recommendations.     PATIENT EDUCATION/GOALS      Goals  Fasting B - 130 mg/dL  Postprandial BG: less than 180 mg/dL  A1c: less than 7%    Provided counseling on lifestyle modifications, medications, and self-monitoring. Patient has no additional questions at this time. Pharmacy to follow up in 6 months. Please reach out with any questions. Thank you.       Chyna Cueto, PharmD    Continue all meds under the continuation of care with the referring provider and clinical pharmacy team.

## 2025-03-15 ENCOUNTER — PHARMACY VISIT (OUTPATIENT)
Dept: PHARMACY | Facility: CLINIC | Age: 75
End: 2025-03-15
Payer: MEDICARE

## 2025-03-17 ENCOUNTER — PHARMACY VISIT (OUTPATIENT)
Dept: PHARMACY | Facility: CLINIC | Age: 75
End: 2025-03-17
Payer: MEDICARE

## 2025-04-08 ENCOUNTER — TELEPHONE (OUTPATIENT)
Dept: ENDOCRINOLOGY | Facility: CLINIC | Age: 75
End: 2025-04-08
Payer: MEDICARE

## 2025-04-08 DIAGNOSIS — E11.65 TYPE 2 DIABETES MELLITUS WITH HYPERGLYCEMIA, WITH LONG-TERM CURRENT USE OF INSULIN: ICD-10-CM

## 2025-04-08 DIAGNOSIS — Z79.4 TYPE 2 DIABETES MELLITUS WITH HYPERGLYCEMIA, WITH LONG-TERM CURRENT USE OF INSULIN: ICD-10-CM

## 2025-04-08 RX ORDER — INSULIN GLARGINE 100 [IU]/ML
20 INJECTION, SOLUTION SUBCUTANEOUS NIGHTLY
COMMUNITY
Start: 2025-04-08

## 2025-04-08 NOTE — TELEPHONE ENCOUNTER
Spoke with patient.  He is having lows for the last two days.  He did not take basaglar or metformin today.     I instructed him to lower Lantus 20 units once daily.  If he is having lows, he was instructed to lower Basaglar further to 15 units.      He will notify us if he is still having lows     Was able to reconnect his Stacie.  He is having lows during the last the last two days.

## 2025-05-06 ENCOUNTER — LAB (OUTPATIENT)
Dept: LAB | Facility: HOSPITAL | Age: 75
End: 2025-05-06
Payer: MEDICARE

## 2025-05-06 DIAGNOSIS — R79.89 LOW SERUM TESTOSTERONE LEVEL IN MALE: ICD-10-CM

## 2025-05-06 DIAGNOSIS — C61 PROSTATE CANCER (MULTI): ICD-10-CM

## 2025-05-06 LAB
HOLD SPECIMEN: NORMAL
HOLD SPECIMEN: NORMAL
PSA SERPL-MCNC: 0.55 NG/ML
TESTOST SERPL-MCNC: <30 NG/DL (ref 240–1000)

## 2025-05-06 PROCEDURE — 84153 ASSAY OF PSA TOTAL: CPT

## 2025-05-06 PROCEDURE — 36415 COLL VENOUS BLD VENIPUNCTURE: CPT

## 2025-05-06 PROCEDURE — 84403 ASSAY OF TOTAL TESTOSTERONE: CPT

## 2025-05-08 ENCOUNTER — TELEMEDICINE (OUTPATIENT)
Dept: HEMATOLOGY/ONCOLOGY | Facility: HOSPITAL | Age: 75
End: 2025-05-08
Payer: MEDICARE

## 2025-05-08 DIAGNOSIS — R79.89 LOW SERUM TESTOSTERONE LEVEL IN MALE: ICD-10-CM

## 2025-05-08 DIAGNOSIS — C61 PROSTATE CANCER (MULTI): ICD-10-CM

## 2025-05-08 PROCEDURE — 1159F MED LIST DOCD IN RCRD: CPT | Performed by: NURSE PRACTITIONER

## 2025-05-08 PROCEDURE — 99212 OFFICE O/P EST SF 10 MIN: CPT | Performed by: NURSE PRACTITIONER

## 2025-05-08 PROCEDURE — 3051F HG A1C>EQUAL 7.0%<8.0%: CPT | Performed by: NURSE PRACTITIONER

## 2025-05-08 PROCEDURE — 1160F RVW MEDS BY RX/DR IN RCRD: CPT | Performed by: NURSE PRACTITIONER

## 2025-05-08 NOTE — PROGRESS NOTES
Consent:  Verbal consent was requested and obtained from patient on this date for a telehealth visit.    Patient ID: Miguelito Edge is a 75 y.o. male.    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 At that time the decision was for him to initiate systemic therapy with ADT and received definitive therapy with 81Gy that was completed in the summer of 2014. ADT was completed in 2017.  Restarted ADT in 9/2020 for rising PSA   Last received leuprolide 22.5 mg on 5/13/24        Subjective    HPI    Follow up for his prostate cancer/monitor PSA.     Energy is normal.   Eating okay.   Denies cough/sob/nausea.   Bowels okay.   Denies dysuria/hematuria.   Denies pain.   Still gets some hot flashes.     Objective    BSA: There is no height or weight on file to calculate BSA.  There were no vitals taken for this visit.     Physical Exam  No exam since video visit  Alert and in NAD. Friendly     Lab Results   Component Value Date    PSA 0.55 05/06/2025    PSA 0.40 03/04/2025    PSA 0.34 12/03/2024     Lab Results   Component Value Date    WBC 6.5 03/04/2025    HGB 13.0 (L) 03/04/2025    HCT 44.0 03/04/2025    MCV 92 03/04/2025     03/04/2025     Lab Results   Component Value Date    GLUCOSE 120 (H) 03/04/2025    CALCIUM 9.7 03/04/2025     03/04/2025    K 5.1 03/04/2025    CO2 27 03/04/2025     03/04/2025    BUN 16 03/04/2025    CREATININE 1.05 03/04/2025     Lab Results   Component Value Date    TESTOSTERONE <30 (L) 05/06/2025     Lab Results   Component Value Date    ALT 10 03/04/2025    AST 15 03/04/2025    ALKPHOS 74 03/04/2025    BILITOT 0.5 03/04/2025      Assessment/Plan      Slight rise in PSA. Feeling well. T still castrate.     Discussed option to restage vs. continue to monitor PSA. He would like to continue to  monitor. He will get labs early August and we will have another virtual visit  8/7.     Our video visit lasted 12 min and 30 seconds.      Diagnoses and all orders for this visit:  Prostate cancer (Multi)  -     Clinic Appointment Request Virtual Est; PACO JOHNSNO  -     CBC and Auto Differential; Future  -     Comprehensive Metabolic Panel; Future  -     Prostate Specific Antigen; Future  -     Testosterone; Future  Low serum testosterone level in male  -     Clinic Appointment Request Virtual Est; PACO JOHNSON, APRN-CNP

## 2025-05-28 PROCEDURE — RXMED WILLOW AMBULATORY MEDICATION CHARGE

## 2025-05-30 ENCOUNTER — PHARMACY VISIT (OUTPATIENT)
Dept: PHARMACY | Facility: CLINIC | Age: 75
End: 2025-05-30
Payer: MEDICARE

## 2025-05-31 PROCEDURE — RXMED WILLOW AMBULATORY MEDICATION CHARGE

## 2025-06-04 ENCOUNTER — PHARMACY VISIT (OUTPATIENT)
Dept: PHARMACY | Facility: CLINIC | Age: 75
End: 2025-06-04
Payer: MEDICARE

## 2025-06-08 PROCEDURE — RXMED WILLOW AMBULATORY MEDICATION CHARGE

## 2025-06-13 ENCOUNTER — PHARMACY VISIT (OUTPATIENT)
Dept: PHARMACY | Facility: CLINIC | Age: 75
End: 2025-06-13
Payer: MEDICARE

## 2025-07-10 ENCOUNTER — APPOINTMENT (OUTPATIENT)
Dept: HEMATOLOGY/ONCOLOGY | Facility: HOSPITAL | Age: 75
End: 2025-07-10
Payer: MEDICARE

## 2025-08-04 DIAGNOSIS — C61 PROSTATE CANCER (MULTI): ICD-10-CM

## 2025-08-06 NOTE — PROGRESS NOTES
Consent:  {Telehealth Consent:[id number]}    Patient ID: Miguelito Edge is a 75 y.o. male.    Treatment Synopsis:    PCa history dates back to 2014 when he was diagnosed with high-risk PCa ([id number]/cT4/N+/GS7- high volume disease) All baseline scans showed no evidence of distant  disease though he did have LN pelvic disease. At that time the decision was for him to initiate systemic therapy with ADT and received definitive therapy with 81Gy that was completed in the summer of 2014. ADT was completed in 2017.  Restarted ADT in 9/2020 for rising PSA   Last received leuprolide 22.5 mg on 5/13/24.   8/2024 decision was made to discontinue ADT and monitor.     Subjective    HPI  Follow up for his prostate cancer/monitor PSA.     Objective    BSA: There is no height or weight on file to calculate BSA.  There were no vitals taken for this visit.     Physical Exam    Performance Status:  {ECOG performance status:90055}    Assessment/Plan    Labs-     Cialis        {Assess/PlanSmartLinks:87545}         Briana Torres, APRN-CNP

## 2025-08-07 ENCOUNTER — TELEMEDICINE (OUTPATIENT)
Dept: HEMATOLOGY/ONCOLOGY | Facility: HOSPITAL | Age: 75
End: 2025-08-07
Payer: MEDICARE

## 2025-08-07 ENCOUNTER — TELEPHONE (OUTPATIENT)
Dept: HEMATOLOGY/ONCOLOGY | Facility: HOSPITAL | Age: 75
End: 2025-08-07
Payer: MEDICARE

## 2025-08-07 DIAGNOSIS — N52.9 ERECTILE DYSFUNCTION, UNSPECIFIED ERECTILE DYSFUNCTION TYPE: ICD-10-CM

## 2025-08-07 DIAGNOSIS — C61 PROSTATE CANCER (MULTI): Primary | ICD-10-CM

## 2025-08-07 RX ORDER — TADALAFIL 10 MG/1
10 TABLET ORAL DAILY PRN
Qty: 30 TABLET | Refills: 0 | Status: SHIPPED | OUTPATIENT
Start: 2025-08-07 | End: 2025-09-06

## 2025-08-07 NOTE — TELEPHONE ENCOUNTER
Called to check on patient since he was not on the video for our virtual visit.   He also did not get labs for the appt.   He will see me in a week in person with labs prior.   Per his request I sent script for cialis. He has viagra on his list, but he said he prefers cialis.

## 2025-08-11 DIAGNOSIS — E11.65 TYPE 2 DIABETES MELLITUS WITH HYPERGLYCEMIA, WITH LONG-TERM CURRENT USE OF INSULIN: Primary | ICD-10-CM

## 2025-08-11 DIAGNOSIS — Z79.4 TYPE 2 DIABETES MELLITUS WITH HYPERGLYCEMIA, WITH LONG-TERM CURRENT USE OF INSULIN: Primary | ICD-10-CM

## 2025-08-14 ENCOUNTER — LAB (OUTPATIENT)
Dept: LAB | Facility: HOSPITAL | Age: 75
End: 2025-08-14
Payer: MEDICARE

## 2025-08-14 ENCOUNTER — OFFICE VISIT (OUTPATIENT)
Dept: HEMATOLOGY/ONCOLOGY | Facility: HOSPITAL | Age: 75
End: 2025-08-14
Payer: MEDICARE

## 2025-08-14 VITALS
BODY MASS INDEX: 37.51 KG/M2 | SYSTOLIC BLOOD PRESSURE: 131 MMHG | TEMPERATURE: 96.8 F | RESPIRATION RATE: 18 BRPM | WEIGHT: 232.4 LBS | HEART RATE: 80 BPM | OXYGEN SATURATION: 96 % | DIASTOLIC BLOOD PRESSURE: 68 MMHG

## 2025-08-14 DIAGNOSIS — C61 PROSTATE CANCER (MULTI): ICD-10-CM

## 2025-08-14 DIAGNOSIS — C61 PROSTATE CANCER (MULTI): Primary | ICD-10-CM

## 2025-08-14 LAB
ALBUMIN SERPL BCP-MCNC: 4 G/DL (ref 3.4–5)
ALP SERPL-CCNC: 68 U/L (ref 33–136)
ALT SERPL W P-5'-P-CCNC: 9 U/L (ref 10–52)
ANION GAP SERPL CALC-SCNC: 13 MMOL/L (ref 10–20)
AST SERPL W P-5'-P-CCNC: 12 U/L (ref 9–39)
BASOPHILS # BLD AUTO: 0.05 X10*3/UL (ref 0–0.1)
BASOPHILS NFR BLD AUTO: 0.8 %
BILIRUB SERPL-MCNC: 0.5 MG/DL (ref 0–1.2)
BUN SERPL-MCNC: 12 MG/DL (ref 6–23)
CALCIUM SERPL-MCNC: 9.4 MG/DL (ref 8.6–10.3)
CHLORIDE SERPL-SCNC: 102 MMOL/L (ref 98–107)
CO2 SERPL-SCNC: 29 MMOL/L (ref 21–32)
CREAT SERPL-MCNC: 1.07 MG/DL (ref 0.5–1.3)
EGFRCR SERPLBLD CKD-EPI 2021: 73 ML/MIN/1.73M*2
EOSINOPHIL # BLD AUTO: 0.26 X10*3/UL (ref 0–0.4)
EOSINOPHIL NFR BLD AUTO: 3.9 %
ERYTHROCYTE [DISTWIDTH] IN BLOOD BY AUTOMATED COUNT: 13.3 % (ref 11.5–14.5)
GLUCOSE SERPL-MCNC: 148 MG/DL (ref 74–99)
HCT VFR BLD AUTO: 39.2 % (ref 41–52)
HGB BLD-MCNC: 12.5 G/DL (ref 13.5–17.5)
IMM GRANULOCYTES # BLD AUTO: 0.02 X10*3/UL (ref 0–0.5)
IMM GRANULOCYTES NFR BLD AUTO: 0.3 % (ref 0–0.9)
LYMPHOCYTES # BLD AUTO: 2.53 X10*3/UL (ref 0.8–3)
LYMPHOCYTES NFR BLD AUTO: 38.2 %
MCH RBC QN AUTO: 27.7 PG (ref 26–34)
MCHC RBC AUTO-ENTMCNC: 31.9 G/DL (ref 32–36)
MCV RBC AUTO: 87 FL (ref 80–100)
MONOCYTES # BLD AUTO: 0.52 X10*3/UL (ref 0.05–0.8)
MONOCYTES NFR BLD AUTO: 7.8 %
NEUTROPHILS # BLD AUTO: 3.25 X10*3/UL (ref 1.6–5.5)
NEUTROPHILS NFR BLD AUTO: 49 %
NRBC BLD-RTO: 0 /100 WBCS (ref 0–0)
PLATELET # BLD AUTO: 183 X10*3/UL (ref 150–450)
POTASSIUM SERPL-SCNC: 3.9 MMOL/L (ref 3.5–5.3)
PROT SERPL-MCNC: 7.3 G/DL (ref 6.4–8.2)
PSA SERPL-MCNC: 0.81 NG/ML
RBC # BLD AUTO: 4.51 X10*6/UL (ref 4.5–5.9)
SODIUM SERPL-SCNC: 140 MMOL/L (ref 136–145)
TESTOST SERPL-MCNC: <30 NG/DL (ref 240–1000)
WBC # BLD AUTO: 6.6 X10*3/UL (ref 4.4–11.3)

## 2025-08-14 PROCEDURE — 84403 ASSAY OF TOTAL TESTOSTERONE: CPT | Performed by: NURSE PRACTITIONER

## 2025-08-14 PROCEDURE — 3051F HG A1C>EQUAL 7.0%<8.0%: CPT | Performed by: NURSE PRACTITIONER

## 2025-08-14 PROCEDURE — 99214 OFFICE O/P EST MOD 30 MIN: CPT | Performed by: NURSE PRACTITIONER

## 2025-08-14 PROCEDURE — 1159F MED LIST DOCD IN RCRD: CPT | Performed by: NURSE PRACTITIONER

## 2025-08-14 PROCEDURE — 1126F AMNT PAIN NOTED NONE PRSNT: CPT | Performed by: NURSE PRACTITIONER

## 2025-08-14 PROCEDURE — 1160F RVW MEDS BY RX/DR IN RCRD: CPT | Performed by: NURSE PRACTITIONER

## 2025-08-14 PROCEDURE — 3075F SYST BP GE 130 - 139MM HG: CPT | Performed by: NURSE PRACTITIONER

## 2025-08-14 PROCEDURE — 3078F DIAST BP <80 MM HG: CPT | Performed by: NURSE PRACTITIONER

## 2025-08-14 PROCEDURE — 84153 ASSAY OF PSA TOTAL: CPT | Performed by: NURSE PRACTITIONER

## 2025-08-14 PROCEDURE — 85025 COMPLETE CBC W/AUTO DIFF WBC: CPT | Performed by: NURSE PRACTITIONER

## 2025-08-14 PROCEDURE — 84075 ASSAY ALKALINE PHOSPHATASE: CPT | Performed by: NURSE PRACTITIONER

## 2025-08-14 PROCEDURE — 36415 COLL VENOUS BLD VENIPUNCTURE: CPT | Performed by: NURSE PRACTITIONER

## 2025-08-14 ASSESSMENT — PAIN SCALES - GENERAL: PAINLEVEL_OUTOF10: 0-NO PAIN

## 2025-08-15 ENCOUNTER — TELEPHONE (OUTPATIENT)
Dept: HEMATOLOGY/ONCOLOGY | Facility: HOSPITAL | Age: 75
End: 2025-08-15
Payer: MEDICARE

## 2025-08-15 DIAGNOSIS — C61 PROSTATE CANCER (MULTI): Primary | ICD-10-CM

## 2025-08-23 PROCEDURE — RXMED WILLOW AMBULATORY MEDICATION CHARGE

## 2025-08-25 ENCOUNTER — PHARMACY VISIT (OUTPATIENT)
Dept: PHARMACY | Facility: CLINIC | Age: 75
End: 2025-08-25
Payer: MEDICARE

## 2025-08-26 ENCOUNTER — PHARMACY VISIT (OUTPATIENT)
Dept: PHARMACY | Facility: CLINIC | Age: 75
End: 2025-08-26

## 2025-09-04 ENCOUNTER — HOSPITAL ENCOUNTER (OUTPATIENT)
Dept: RADIOLOGY | Facility: HOSPITAL | Age: 75
Discharge: HOME | End: 2025-09-04
Payer: MEDICARE

## 2025-09-04 ENCOUNTER — APPOINTMENT (OUTPATIENT)
Dept: ENDOCRINOLOGY | Facility: CLINIC | Age: 75
End: 2025-09-04
Payer: MEDICARE

## 2025-09-04 ENCOUNTER — TELEMEDICINE (OUTPATIENT)
Dept: PHARMACY | Facility: HOSPITAL | Age: 75
End: 2025-09-04
Payer: MEDICARE

## 2025-09-04 ENCOUNTER — APPOINTMENT (OUTPATIENT)
Dept: HEMATOLOGY/ONCOLOGY | Facility: HOSPITAL | Age: 75
End: 2025-09-04
Payer: MEDICARE

## 2025-09-04 DIAGNOSIS — C61 PROSTATE CANCER (MULTI): ICD-10-CM

## 2025-09-04 DIAGNOSIS — E11.65 TYPE 2 DIABETES MELLITUS WITH HYPERGLYCEMIA, WITH LONG-TERM CURRENT USE OF INSULIN: Primary | ICD-10-CM

## 2025-09-04 DIAGNOSIS — Z79.4 TYPE 2 DIABETES MELLITUS WITH HYPERGLYCEMIA, WITH LONG-TERM CURRENT USE OF INSULIN: Primary | ICD-10-CM

## 2025-09-04 PROCEDURE — 78815 PET IMAGE W/CT SKULL-THIGH: CPT | Mod: PET TUMOR INIT TX STRAT | Performed by: RADIOLOGY

## 2025-09-04 PROCEDURE — 3430000001 HC RX 343 DIAGNOSTIC RADIOPHARMACEUTICALS: Mod: TB | Performed by: NURSE PRACTITIONER

## 2025-09-04 PROCEDURE — A9596 HC RX 343 DIAGNOSTIC RADIOPHARMACEUTICALS: HCPCS | Mod: TB | Performed by: NURSE PRACTITIONER

## 2025-09-04 PROCEDURE — 78815 PET IMAGE W/CT SKULL-THIGH: CPT | Mod: PI

## 2025-09-04 RX ADMIN — KIT FOR THE PREPARATION OF GALLIUM GA 68 GOZETOTIDE INJECTION 5.59 MILLICURIE: KIT INTRAVENOUS at 08:58

## 2025-09-04 ASSESSMENT — ENCOUNTER SYMPTOMS
DEPRESSION: 0
LOSS OF SENSATION IN FEET: 0
OCCASIONAL FEELINGS OF UNSTEADINESS: 0

## 2025-09-04 ASSESSMENT — PATIENT HEALTH QUESTIONNAIRE - PHQ9
2. FEELING DOWN, DEPRESSED OR HOPELESS: NOT AT ALL
1. LITTLE INTEREST OR PLEASURE IN DOING THINGS: NOT AT ALL
SUM OF ALL RESPONSES TO PHQ9 QUESTIONS 1 AND 2: 0

## 2025-09-04 ASSESSMENT — COLUMBIA-SUICIDE SEVERITY RATING SCALE - C-SSRS
1. IN THE PAST MONTH, HAVE YOU WISHED YOU WERE DEAD OR WISHED YOU COULD GO TO SLEEP AND NOT WAKE UP?: NO
6. HAVE YOU EVER DONE ANYTHING, STARTED TO DO ANYTHING, OR PREPARED TO DO ANYTHING TO END YOUR LIFE?: NO
2. HAVE YOU ACTUALLY HAD ANY THOUGHTS OF KILLING YOURSELF?: NO

## 2025-09-04 ASSESSMENT — PAIN SCALES - GENERAL: PAINLEVEL_OUTOF10: 0-NO PAIN

## 2025-10-02 ENCOUNTER — APPOINTMENT (OUTPATIENT)
Dept: HEMATOLOGY/ONCOLOGY | Facility: HOSPITAL | Age: 75
End: 2025-10-02
Payer: MEDICARE